# Patient Record
Sex: FEMALE | Race: BLACK OR AFRICAN AMERICAN | NOT HISPANIC OR LATINO | Employment: FULL TIME | URBAN - METROPOLITAN AREA
[De-identification: names, ages, dates, MRNs, and addresses within clinical notes are randomized per-mention and may not be internally consistent; named-entity substitution may affect disease eponyms.]

---

## 2019-01-14 ENCOUNTER — NON-PROVIDER VISIT (OUTPATIENT)
Dept: OBGYN | Facility: CLINIC | Age: 24
End: 2019-01-14

## 2019-01-14 DIAGNOSIS — Z32.00 ENCOUNTER FOR PREGNANCY TEST, RESULT UNKNOWN: ICD-10-CM

## 2019-01-14 LAB
INT CON NEG: NEGATIVE
INT CON POS: POSITIVE
POC URINE PREGNANCY TEST: POSITIVE

## 2019-01-14 PROCEDURE — 81025 URINE PREGNANCY TEST: CPT | Performed by: OBSTETRICS & GYNECOLOGY

## 2019-02-13 ENCOUNTER — INITIAL PRENATAL (OUTPATIENT)
Dept: OBGYN | Facility: CLINIC | Age: 24
End: 2019-02-13
Payer: MEDICAID

## 2019-02-13 VITALS
SYSTOLIC BLOOD PRESSURE: 112 MMHG | HEIGHT: 65 IN | BODY MASS INDEX: 20.99 KG/M2 | DIASTOLIC BLOOD PRESSURE: 58 MMHG | WEIGHT: 126 LBS

## 2019-02-13 DIAGNOSIS — N76.0 BACTERIAL VAGINOSIS: ICD-10-CM

## 2019-02-13 DIAGNOSIS — E10.9 TYPE 1 DIABETES MELLITUS WITHOUT COMPLICATION (HCC): ICD-10-CM

## 2019-02-13 DIAGNOSIS — O21.9 NAUSEA AND VOMITING DURING PREGNANCY: ICD-10-CM

## 2019-02-13 DIAGNOSIS — B96.89 BACTERIAL VAGINOSIS: ICD-10-CM

## 2019-02-13 DIAGNOSIS — O09.90 SUPERVISION OF HIGH RISK PREGNANCY, ANTEPARTUM: Primary | ICD-10-CM

## 2019-02-13 LAB
APPEARANCE UR: ABNORMAL
BILIRUB UR STRIP-MCNC: ABNORMAL MG/DL
COLOR UR AUTO: ABNORMAL
GLUCOSE UR STRIP.AUTO-MCNC: NEGATIVE MG/DL
KETONES UR STRIP.AUTO-MCNC: NEGATIVE MG/DL
LEUKOCYTE ESTERASE UR QL STRIP.AUTO: NEGATIVE
NITRITE UR QL STRIP.AUTO: NEGATIVE
PH UR STRIP.AUTO: 8.5 [PH] (ref 5–8)
PROT UR QL STRIP: ABNORMAL MG/DL
RBC UR QL AUTO: NEGATIVE
SP GR UR STRIP.AUTO: 1.02
UROBILINOGEN UR STRIP-MCNC: ABNORMAL MG/DL

## 2019-02-13 PROCEDURE — 81002 URINALYSIS NONAUTO W/O SCOPE: CPT | Performed by: NURSE PRACTITIONER

## 2019-02-13 PROCEDURE — 59401 PR NEW OB VISIT: CPT | Performed by: NURSE PRACTITIONER

## 2019-02-13 RX ORDER — PROMETHAZINE HYDROCHLORIDE 25 MG/1
25 TABLET ORAL EVERY 6 HOURS PRN
Qty: 30 TAB | Refills: 0 | Status: SHIPPED | OUTPATIENT
Start: 2019-02-13 | End: 2019-05-30

## 2019-02-13 RX ORDER — METRONIDAZOLE 500 MG/1
500 TABLET ORAL 2 TIMES DAILY
Qty: 14 TAB | Refills: 0 | Status: SHIPPED | OUTPATIENT
Start: 2019-02-13 | End: 2019-02-20

## 2019-02-13 ASSESSMENT — ENCOUNTER SYMPTOMS
EYES NEGATIVE: 1
GASTROINTESTINAL NEGATIVE: 1
PSYCHIATRIC NEGATIVE: 1
RESPIRATORY NEGATIVE: 1
NEUROLOGICAL NEGATIVE: 1
CARDIOVASCULAR NEGATIVE: 1
MUSCULOSKELETAL NEGATIVE: 1
CONSTITUTIONAL NEGATIVE: 1

## 2019-02-13 NOTE — PROGRESS NOTES
Pt here today for NOB visit  LMP: 11/28/2018  WT: 126 lb  BP: 112/58  Pt states having a lot of N&V. Pt also reports having vaginal itching with white vaginal discharge x 1 week. States no other concerns.    Pt states she has Type I diabetes Dx in 2005 on insulin taking Novolin 70/30  Desires AFP Screening  Pt states she received the influenza vaccine in 11/2018  Preferred pharmacy verified with patient   Chaperone offered, pt accepts  Good # [phone]

## 2019-02-13 NOTE — LETTER
Cystic Fibrosis Carrier Testing  Nicolasa Curtis    The following information is about a blood test that can be done to determine if you and/or your partner carry the gene for cystic fibrosis.    WHAT IS CYSTIC FIBROSIS?  · Cystic fibrosis (CF) is an inherited disease that affects more than 25,000 American children and young adults.  · Symptoms of CF vary but include lung congestion, pneumonia, diarrhea and poor growth.  Most people with CF have severe medical problems and some die at a young age.  Others have so few symptoms they are unaware they have CF.  · CF does not affect intelligence.  · Although there is no cure for CF at this time, scientists are making progress in improving treatment and in searching for a cure.  In the past many people with CF  at a very young age.  Today, many are living into their 20’s and 30’s.    IS THERE A CHANCE MY BABY COULD HAVE CYSTIC FIBROSIS?  · You can have a child with CF even if there is no history in your family (see chart below).  · CF testing can help determine if you are a carrier and at risk to have a child with CF.  Note: if both parents are carriers, there is a 1 in 4 (25%) chance with each pregnancy that they will have a child with CF.  · Carriers have one normal CF gene and one altered CF gene.  · People with CF have two altered CF genes.  · Most people have two normal copies of the CF gene.    Approximate risk that a couple with no family history of cystic fibrosis will have a child with cystic fibrosis:    Ethnic background / Risk     couple:  1 in 2,500   couple:  1 in 15,000            couple:  1 in 8,000     American couple:  1 in 32,000     WHAT TESTING IS AVAILABLE?  · There is a blood test that can be done to find out if you or your partner is a carrier.  · It is important to understand that CF carrier testing does not detect all CF carriers.  · If the test shows that you are both CF carriers, you unborn baby  can be tested to find out if the baby has CF.    HOW MUCH DOES IT COST TO HAVE CYSTIC FIBROSIS CARRIER TESTING?  · Cost and insurance coverage for CF carrier testing vary depending upon the laboratory used and your insurance policy.  · The average cost for CF carrier testing is $300 per person.  · Your genetic counselor can provide you with more information about cystic fibrosis carrier testing.    _____  Yes, I am interested in discussing carrier testing with a genetic counselor.    _____  No, I am not interested in CF carrier testing or in receiving more information about CF carrier testing.      Client signature: ________________________________________  2/13/2019

## 2019-02-13 NOTE — PROGRESS NOTES
"S:  Nicolasa Curtis is a 23 y.o.  who presents for her new OB exam.  She is 11w0d with and ELIZABETH of Estimated Date of Delivery: 19 based off of LMP . She has no complaints.  She is currently not working. Discussed heavy lifting and chemical exposure. No ER visits or previous care in this pregnancy.     Desires AFP.  Declines CF.  Denies VB, LOF, or cramping.  Denies dysuria, vaginal DC. Reports no fetal movement.     Pt is single and lives with boyfriend.  Pregnancy is unplanned but desired.    She is a type 1 diabetic. Hemoglobin A1C ordered, referral to endocrinology and perinatology placed.      Discussed diet and exercise during pregnancy. Encouraged good nutrition, and daily exercise including walking or swimming. Discussed expected weight gain during pregnancy. Discussed adequate hydration during pregnancy.    Past Medical History:   Diagnosis Date   • Diabetes (HCC)     Type I Dx in      History reviewed. No pertinent family history.  Social History     Social History   • Marital status: Single     Spouse name: N/A   • Number of children: N/A   • Years of education: N/A     Occupational History   • Not on file.     Social History Main Topics   • Smoking status: Never Smoker   • Smokeless tobacco: Never Used   • Alcohol use No   • Drug use: No   • Sexual activity: Yes     Partners: Male      Comment: Unplanned pregnancy. pt states was not on birth control     Other Topics Concern   • Not on file     Social History Narrative   • No narrative on file     OB History    Para Term  AB Living   1             SAB TAB Ectopic Molar Multiple Live Births                    # Outcome Date GA Lbr Chuy/2nd Weight Sex Delivery Anes PTL Lv   1 Current                   History of Varicella Virus: yes  History of HSV I or II in self or partner: no  History of Thyroid problems: no    O:  Blood pressure 112/58, height 1.651 m (5' 5\"), weight 57.2 kg (126 lb), last menstrual period 2018.   See " "Prenatal Physical.    Wet mount: yes, slide collected and read by me: positive whiff, neg yeast, many clue cells, neg trich. Will treat for BV  Chaperone offered: yes and present    A:   1.  IUP @ 11w0d per LMP        2.  S=D        3.  See problem list below        4.  Type 1 diabetic        5.  Bacterial vaginosis     Patient Active Problem List    Diagnosis Date Noted   • Type 1 diabetes mellitus without complication (HCC) 02/13/2019   • Supervision of high risk pregnancy, antepartum 02/13/2019         P:  1.  GC/CT & pap done        2.  Prenatal labs ordered - lab slip given        3.  Discussed PNV, diet, avoidances and adequate water intake        4.  NOB packet given        5.  Return to office in 1-2 wks        6.  Complete OB US 8-9 wks        7.  Diabetic education        8.  Hemoglobinopathy profile        9.  Endocrinology and perinatology referral    No orders of the defined types were placed in this encounter.      HPI    Review of Systems   Constitutional: Negative.    HENT: Negative.    Eyes: Negative.    Respiratory: Negative.    Cardiovascular: Negative.    Gastrointestinal: Negative.    Genitourinary: Negative.    Musculoskeletal: Negative.    Skin: Negative.    Neurological: Negative.    Endo/Heme/Allergies: Negative.    Psychiatric/Behavioral: Negative.    All other systems reviewed and are negative.         Objective:     /58   Ht 1.651 m (5' 5\")   Wt 57.2 kg (126 lb)   LMP 11/28/2018   BMI 20.97 kg/m²      Physical Exam   Constitutional: She is oriented to person, place, and time. She appears well-developed and well-nourished.   HENT:   Head: Normocephalic.   Nose: Nose normal.   Eyes: Conjunctivae are normal.   Neck: Normal range of motion. Neck supple.   Cardiovascular: Normal rate, regular rhythm, normal heart sounds and intact distal pulses.    Pulmonary/Chest: Effort normal and breath sounds normal.   Abdominal: Soft. Bowel sounds are normal.   Genitourinary: Uterus normal. " Vaginal discharge found.   Musculoskeletal: Normal range of motion.   Neurological: She is alert and oriented to person, place, and time.   Skin: Skin is warm and dry. Capillary refill takes less than 2 seconds.   Psychiatric: She has a normal mood and affect. Her behavior is normal. Judgment and thought content normal.   Nursing note and vitals reviewed.       Assessment/Plan:     1. Type 1 diabetes mellitus without complication (HCC)  - HEMOGLOBIN A1C; Future  - REFERRAL TO DIABETIC EDUCATION Diabetes Self Management Education / Training (DSME/T) and Medical Nutrition Therapy (MNT): Initial Group DSME/MNT as authorized by payor; DSME/T Content: Diabetes as disease process, Preconception / pregnancy manage...  - REFERRAL TO ENDOCRINOLOGY  - POCT Urinalysis    2. Supervision of high risk pregnancy, antepartum  - US-OB 2ND 3RD TRI COMPLETE; Future  - THINPREP RFLX HPV ASCUS W/CTNG; Future  - PREG CNTR PRENATAL PN; Future  - REFERRAL TO PERINATOLOGY  - POCT Urinalysis  - HEMOGLOBINOPATHY PROFILE; Future    3. Bacterial vaginosis  - metroNIDAZOLE (FLAGYL) 500 MG Tab; Take 1 Tab by mouth 2 Times a Day for 7 days.  Dispense: 14 Tab; Refill: 0    4. Nausea and vomiting during pregnancy  - promethazine (PHENERGAN) 25 MG Tab; Take 1 Tab by mouth every 6 hours as needed for Nausea/Vomiting.  Dispense: 30 Tab; Refill: 0

## 2019-02-20 LAB
ABO GROUP BLD: NORMAL
BLD GP AB SCN SERPL QL: NORMAL
HBA1C MFR BLD: 10.2 % (ref ?–5.8)
HCT VFR BLD AUTO: NORMAL %
HGB BLD-MCNC: NORMAL G/DL
HIV 1+2 AB+HIV1 P24 AG SERPL QL IA: NORMAL
RH BLD: NORMAL
RUBV IGG SERPL IA-ACNC: NORMAL
TREPONEMA PALLIDUM IGG+IGM AB [PRESENCE] IN SERUM OR PLASMA BY IMMUNOASSAY: NORMAL

## 2019-02-21 ENCOUNTER — NON-PROVIDER VISIT (OUTPATIENT)
Dept: OBGYN | Facility: CLINIC | Age: 24
End: 2019-02-21
Payer: MEDICAID

## 2019-02-21 ENCOUNTER — ROUTINE PRENATAL (OUTPATIENT)
Dept: OBGYN | Facility: CLINIC | Age: 24
End: 2019-02-21
Payer: MEDICAID

## 2019-02-21 VITALS — BODY MASS INDEX: 21.47 KG/M2 | SYSTOLIC BLOOD PRESSURE: 108 MMHG | DIASTOLIC BLOOD PRESSURE: 76 MMHG | WEIGHT: 129 LBS

## 2019-02-21 DIAGNOSIS — O24.011: ICD-10-CM

## 2019-02-21 DIAGNOSIS — E10.9 TYPE 1 DIABETES MELLITUS WITHOUT COMPLICATION (HCC): ICD-10-CM

## 2019-02-21 LAB — GLUCOSE BLD-MCNC: 374 MG/DL (ref 70–100)

## 2019-02-21 PROCEDURE — 97802 MEDICAL NUTRITION INDIV IN: CPT | Performed by: DIETITIAN, REGISTERED

## 2019-02-21 PROCEDURE — 82962 GLUCOSE BLOOD TEST: CPT | Performed by: OBSTETRICS & GYNECOLOGY

## 2019-02-21 PROCEDURE — 90040 PR PRENATAL FOLLOW UP: CPT | Performed by: OBSTETRICS & GYNECOLOGY

## 2019-02-21 RX ORDER — LANCETS
1 EACH MISCELLANEOUS 4 TIMES DAILY
Qty: 120 EACH | Refills: 2 | Status: SHIPPED | OUTPATIENT
Start: 2019-02-21 | End: 2019-05-30 | Stop reason: CLARIF

## 2019-02-21 NOTE — PROGRESS NOTES
Diabetic prenatal visit;    12w1d  Patient Active Problem List    Diagnosis Date Noted   • Type 1 diabetes mellitus without complication (HCC) 02/13/2019   • Supervision of high risk pregnancy, antepartum 02/13/2019       The patient presents for prenatal care. She is doing well. Denies contractions leakage of fluid or vaginal bleeding. Having good fetal movement    Vitals:    02/21/19 1401   BP: 108/76   Weight: 58.5 kg (129 lb)       Hemoglobin A1C; performed but results not available yet    Fasting sugars; not testing yet  One hour Postprandial sugars; not testing yet      Size equals dates, normal fetal heart rate      Continue ADA diet      New insulin regimen;   Morning; 50 units Novolin  Dinnertime; 0  Bedtime; 50 units Novolin      Patient pre-existing type I diabetic since age 7 recently moved from [place].  The patient has very poorly treated-probably untreated type 1 diabetes.  She states in the past she can run anywhere from 500-700 and has been admitted multiple times in the last year for control of blood sugars in [place].  Referral for consultation with endocrinology and maternal-fetal medicine have been placed and we are waiting for appointments to be made.    The patient has her diabetic teaching class for fingerstick testing tomorrow.  She will she will follow up with us next week.  Hopefully at this time will have the results of her prenatal labs and hemoglobin A1c.    In addition, she will need ophthalmology examination for retinal evaluation as well as EKG.    We discussed the risks of teratogenic  effects of elevated blood sugar on fetus.    NSTs twice weekly starting at 32 weeks if class AII GDM or worse  Increase fluid hydration to 2 L per day  Discussed proper shoes to be worn her pregnancy  Increase exercise daily walking 30 minutes per day  Delivery by 39 weeks if class AII GDM or worse  Discussed support hose use during pregnancy    Followup; 1 weeks

## 2019-02-21 NOTE — PROGRESS NOTES
OB f/u GDM. Good #[phone]   -FM  Pt. Denies VB, LOF, or UC's.   Pharmacy verified.  Diabetic supplies   BS in clinic : 374 Pt states last ate at 1400 pm  Chaperone offered and Provided  Pt states AM:50 novolin  QHS:50 novolin

## 2019-02-21 NOTE — PROGRESS NOTES
Subjective:  -Pt states she taking NPH 70/30 50 units in AM and 50 units in PM   -Has been feeling nauseous and eats only 2 times per day   -Had chicken, mashed potatoes and fruit with a lot of juice (POC was 374)   -Pt states she has a meter at home but doesn't know the name and states it is old   -Pt recently moved her from [place]   -Does not yet have an Endocrinologist but referral is pending     Objective:   Anthropometrics:  Today's weight: 129lb  Pre-pregnancy weight: 115lb  Total weight gain: +14lb  Weeks gestation: 12w1d    Biochemical data, medical test and procedures:  No results found for: HBA1C@  Lab Results   Component Value Date/Time    POCGLUCOSE 374 (A) 02/21/2019 02:11 PM       Glucose Log Book (most recent):   NA. Pt is not checking or recording FSBS     Assessment:   Nutrition Diagnosis (PES Statement):  · Altered nutrition related lab values related to endocrine dysfunction as evidenced by OGTT    Recommended Diet:  Pt will be given her meal plan tomorrow     Assessment Notes:   Briefly introduced pt to diabetes in pregnancy meal plan. Explained the importance of eating a carb controlled diet, small frequent meals and to avoid all simple sugars. She agrees to stop drinking juice.   Gave pt the Accucheck glucometer today and a Rx was sent to her pharmacy to  the supplies.         Follow up tomorrow for GDM/Diabetes Class   Miranda Zambrano, RD, LD, CDE  [phone]

## 2019-02-22 ENCOUNTER — NON-PROVIDER VISIT (OUTPATIENT)
Dept: HEALTH INFORMATION MANAGEMENT | Facility: MEDICAL CENTER | Age: 24
End: 2019-02-22
Payer: MEDICAID

## 2019-02-22 VITALS — BODY MASS INDEX: 21.26 KG/M2 | HEIGHT: 65 IN | WEIGHT: 127.6 LBS

## 2019-02-22 DIAGNOSIS — O24.019 TYPE 1 DIABETES MELLITUS DURING PREGNANCY, ANTEPARTUM: ICD-10-CM

## 2019-02-22 PROCEDURE — G0109 DIAB MANAGE TRN IND/GROUP: HCPCS | Performed by: INTERNAL MEDICINE

## 2019-02-22 NOTE — PROGRESS NOTES
Nicolasa came to the Gestational Diabetes program.  She has had Type 1 Diabetes since age 7.  She is currently taking 70/30 insulin 50 units in the morning and at bedtime.  She recently moved here and has not had a doctor managing her diabetes.  She states her father has Type 2 Diabetes.  She denies any problems with this pregnancy.  She was supplied an One Touch Verio Flex meter.  She was able to do a return demonstration without difficulty.   She will keep walking and stay well hydrated with water. Her meal plan is scanned into Media and her Doctor Letters with what was taught can be found under the Letters tab.

## 2019-02-22 NOTE — PROGRESS NOTES
Nicolasa received a 1800 calorie meal plan (based on 30 kcal/kg of current weight) consisting of 3 meals and 3 snacks (see media for copy of meal plan).   Pt's prepregnancy weight was: 115lb. She has gained +12.6lb so far in this pregnancy.     Recommended meal times:   B: 8am   S: 10am   L:12pm   S: 3pm   D: 6-6:30pm   S: 10pm     Nicolasa was given an Accucheck meter at the pregnancy center yesterday, however, the One Touch Verio Flex will be a more appropriate choice based on her insurance coverage. She agrees to call [facility] today to get them to order her supplies for this meter.    Pt was educated on carbohydrate containing foods vs non carbohydrate containing foods, the importance of small frequent meals, limiting carbohydrate to 1 serving in the morning, no fruit before noon/12pm, and avoiding all concentrated sweets for the remainder of her pregnancy. Explained the importance of not going >4hrs btwn eating during the day and no longer than 10hours overnight. Patient agrees to follow the meal plan and guidelines provided.

## 2019-02-22 NOTE — LETTER
February 22, 2019                   Re: Nicolasa Curtis     1995         [id number]       Mariama Rocha C.N.M.  [address] [place] [address]  [place] [address]      Dear :Mariama Rocha C.N.M.    On 2/22/2019, your patient Nicolasa Curtis, received 1 hour of nurse training from the Diabetes Center at [facility] for management of her gestational diabetes.  Her Estimated Date of Delivery: 9/4/19.  We taught the following subjects:    Introduction to gestational diabetes, benefits and responsibilities of patient, physiology of diabetes and the diease process, benefits of blood glucose monitoring and record keeping, medication action and possible side effects, hypoglycemia, sick day management, exercise, stress reduction and travel with diabetes.       Nurse assessment / Education:    Comments:    Edema:no      Weight:Weight: 57.9 kg (127 lb 9.6 oz)         Complaints:yes - Type 1 Diabetes uncontrolled.      Pathophysiology of diabetes in pregnancy    Discuss  potential maternal and fetal complications in pregnancy with diabetes.     Importance of blood glucose monitoring   Proper testing technique using a One Touch Verio Flex meter.    At 12 pm, the meter read 176, which was 2.5 hours after eating.  Testing: fasting and one hour after meals,  expected ranges and rationale for strict control.     Ketone test today:no       Recognition and treatment of hypoglycemia.     Insulin taught: No, currently taking 70/30 50 units in the am and at bedtime.  Insulin briefly dicussed at this time.    Should patient require insulin later in pregnancy, she would need further education.   IReviewed fetal kick counts and other tests to determine fetal well-being  Discuss benefits and risks of exercise in pregnancy  Discuss when to call Doctor  Discuss sick day care  Importance of wearing diabetes identification      Patient/caregiver appeared to understand the content as demonstrated by appropriate questions.     Nicolasa  Fariba Curtis was encouraged to discuss this further with you.    Hopefully this will help in your management of her care.  If we can be of further assistance, please feel free to call.    Thank you for the referral.    Sincerely,    Radha Ugalde RN CDE  GDM CLASS  Certified Diabetes Educator

## 2019-02-22 NOTE — LETTER
February 22, 2019                   Re: Nicolasa Curtis     1995             Mariama Rocha C.N.M.  [address] [place] [address]      Dear :Mariama Rocha C.N.M.    On 2/22/2019, your patient Nicolasa Curtis, received 1 hours of nutrition training from the Diabetes Center at [facility] for management of her gestational diabetes.  Her EDC is Estimated Date of Delivery: 9/4/19.  We taught the following subjects:    Patient was provided with a 1800 calorie meal plan with 3 meals and 3 snacks.  Meal plan contains 180 grams of carbohydrates per day.   Importance of meal planning in diabetes management during pregnancy  Importance of consistent timing of meals and snacks and agreed upon times  Avoidance of simple carbohydrates  Metabolism of food components relating to pregnancy  Identification of foods in food groups  Patient demonstrates adequate ability to utilize meal planning manual for reference  Plan 3 meals and 3 snacks with 90% accuracy  Review basic principles of eating out  Reviewed precautions with artificial sweeteners    Comments:  Nicolasa agreed to follow the meal plan and to eat at the times agreed upon.  She will check blood glucose 4 times a day and record the values in her log book.       Hopefully this will help in your management of her care.  If we can be of further assistance, please feel free to call.    Thank you for the referral.    Sincerely,  Miranda Zambrano, GRABIEL, LD, CDE  [phone]

## 2019-02-26 ENCOUNTER — TELEPHONE (OUTPATIENT)
Dept: OBGYN | Facility: CLINIC | Age: 24
End: 2019-02-26

## 2019-02-26 DIAGNOSIS — O24.011 TYPE 1 DIABETES MELLITUS COMPLICATING PREGNANCY IN FIRST TRIMESTER, ANTEPARTUM: ICD-10-CM

## 2019-02-26 NOTE — TELEPHONE ENCOUNTER
Pt called requesting an Rx for Novolong 70/30 insulin, states she was told that she may have to switch to a different insulin. Rx sent to pharmacy on file per Dr. Navarro. MD to continue in current insulin and checking BS, bring log next appt.   Also test strips Rx sent.  Verbalized understanding.

## 2019-02-28 ENCOUNTER — ROUTINE PRENATAL (OUTPATIENT)
Dept: OBGYN | Facility: CLINIC | Age: 24
End: 2019-02-28
Payer: MEDICAID

## 2019-02-28 ENCOUNTER — TELEPHONE (OUTPATIENT)
Dept: OBGYN | Facility: CLINIC | Age: 24
End: 2019-02-28

## 2019-02-28 ENCOUNTER — NON-PROVIDER VISIT (OUTPATIENT)
Dept: OBGYN | Facility: CLINIC | Age: 24
End: 2019-02-28
Payer: MEDICAID

## 2019-02-28 VITALS — SYSTOLIC BLOOD PRESSURE: 94 MMHG | BODY MASS INDEX: 21.3 KG/M2 | WEIGHT: 128 LBS | DIASTOLIC BLOOD PRESSURE: 62 MMHG

## 2019-02-28 DIAGNOSIS — O24.011 TYPE 1 DIABETES MELLITUS AFFECTING PREGNANCY IN FIRST TRIMESTER, ANTEPARTUM: ICD-10-CM

## 2019-02-28 DIAGNOSIS — O24.019 TYPE 1 DIABETES MELLITUS DURING PREGNANCY, ANTEPARTUM: ICD-10-CM

## 2019-02-28 DIAGNOSIS — O09.90 SUPERVISION OF HIGH RISK PREGNANCY, ANTEPARTUM: ICD-10-CM

## 2019-02-28 PROCEDURE — 97803 MED NUTRITION INDIV SUBSEQ: CPT | Performed by: DIETITIAN, REGISTERED

## 2019-02-28 PROCEDURE — 90040 PR PRENATAL FOLLOW UP: CPT | Performed by: OBSTETRICS & GYNECOLOGY

## 2019-02-28 RX ORDER — ONDANSETRON 4 MG/1
4 TABLET, FILM COATED ORAL EVERY 4 HOURS PRN
Qty: 20 TAB | Refills: 2 | Status: SHIPPED | OUTPATIENT
Start: 2019-02-28 | End: 2019-05-30

## 2019-02-28 NOTE — PROGRESS NOTES
Follow Up Nutrition Assessment:   Time: 2:45-3:20pm     Subjective:  -Pt c/o ongoing nausea, worse in the morning, but some days all day long   -Doesn't usually eat until lunch time   -Had low blood sugar this morning of 41 which she corrected with juice and now despite not eating anything besides peanuts her blood sugars are in high 200s    -Ate pizza 2 slices for dinner last night   -No bedtime snack . Snacks that she likes include yogurt, peanuts/nuts, and cheese.   -Not checking FSBS 4 times a day   -Is taking NPH 70/30 50 units BID (AM and before Dinner)    Objective:   Anthropometrics:   Today's Weight: 128lb    Pre-pregnancy weight: 115lb  Total weight gain: +13  Weeks gestation: 13w1d    Biochemical data, medical test and procedures:  Lab Results   Component Value Date/Time    POCGLUCOSE 374 (A) 02/21/2019 02:11 PM       Glucose Log Book (most recent):    Fasting glucose range: 41-77   1 hour post prandial glucose range:     Assessment:   Recommended Diet:  1800 calorie meal plan (see media for detailed meal plan)    Assessment Notes:   Pt is not checking FSBS as recommended to, and is not following the meal plan. She states it is too hard to eat when she feels nauseous.   Dr. Corley would like pt to start short acting insulin based on carb counting using a 1:10 insulin to carb ratio, in addition to NPH BID.   Today we reviewed how to carb count. Reviewed label reading, calorieking apps, and estimation method. Pt's significant other was present for the education and was involved in learning.     Pt was given the following insulin regimen to start with:   NPH 50 units q AM   NPH 35 units q HS   Lispro 1:10g carbs, at each meal   This will very likely need to be adjusted by OB or Endo next week.   Pt was informed to contact the office/MD (or proceed to ER) if experiencing any extreme high or low blood sugars on this new insulin regimen       Plan: Monitoring & Evaluation  Goals:  1. Follow meal plan as  outlined above; 3 meals and 3 snacks daily.   2. Check FSBS 4 times a day  (fasting, and 1 hour after each meal)  3. FSBS Goals= Fasting <90; 1 hour PP <130   4. Bring blood sugar log book to each appointment   5. Appropriate weight gain during pregnancy  6. Use milk 8oz to correct for hypoglycemia <60   7. Call endocrinology to set up that appt ASAP  8. Begin carb counting using 1:10g insulin to carb ratio   9. Avoid fruit in the morning   10. Do not skip breakfast; at least have a small snack       Follow up 1 week   Miranda Zambrano, RD, LD, CDE  [phone]

## 2019-02-28 NOTE — TELEPHONE ENCOUNTER
Pt stated she called Endocrinology and was told they would call her within 24 hrs to schedule appt and still has not heard back from them.   Called [facility] endocrinology, spoke with Mo, she stated they called pt on 2/25/19 and left her a VM but patient has not called them back to schedule. Pt notified

## 2019-02-28 NOTE — PROGRESS NOTES
Pt. Here for OB/FU New GDM.   U/S on 5/1/19  Good # [phone]  Pt states having some nausea.   Pharmacy verified.   BS in clinic : 197 Pt states last ate at 11:30 am pt states had peanuts.   Pt states has appt with Dr. Alvarado 3/5/19   Pt given AFP lab slip today along with instructions.   Pt states already received flu vaccine.   Pt given phone # to call [facility] to make appt.

## 2019-03-01 NOTE — PROGRESS NOTES
Chief complaint: Routine OB visit    S: Pt here for OB follow up. Doing well.   Patient has a long-standing history of type 1 diabetes since age 7.  Per her report, had greater than 10 hospitalizations last year due to DKA.    Last eye exam .    Currently on 50 units of NPH 70/30 in the morning and 50 units NPH 70/30 in the evening.    Review of sugar logs show highly elevated level some in the 300s.  Most fasting levels greater than 100.    negative fetal movement.    negative vaginal bleeding.    negative leakage of fluid.    negative contractions.    Reviewed blood sugar log with patient.  Reviewed diet.  Questions answered.    O: VSS Afeb      See prenatal vitals, chart for today's exam      Insulin regimen  As above      A/P:  23 y.o.  13w1d with insulin-dependent diabetes who presents for obstetric follow-up.    1.  Labor precautions and fetal kick counts educated  2.  Change insulin dosage as follows:  -Stop NPH 70/30.  -Start NPH 50 units in a.m., 35 units in p.m.  -Start lispro 1-10 carb ratio during meals  Urgent referral to endocrinology placed.  Patient would likely benefit from insulin pump  3.  NSTs: 2x/week to begin at 32 weeks.  4.  Continue prenatal vitamins.  5.  Watch weight gain.  Exercise at least 30 minutes daily  6.  Drink at least 2 liters of water daily.  7.  Encouraged prenatal classes.  8.  Follow-up in 2 weeks for obstetric follow-up.    Last A1c 10.2.  Discussed with the patient, complications associated with uncontrolled diabetes during pregnancy.  Mainly risk of: Preeclampsia,  premature rupture membranes,  labor, intrauterine fetal demise, cardiac abnormalities, anencephaly, abnormalities during labor, increased risk of ,  hypoglycemia.  Tight sugar control recommended to patient    We will also obtain metabolic panel, baseline 24-hour urine protein, TSH, free T4    Discussed with the patient quad screen as well as CF and hemoglobin  electrophoresis screening.  Patient opted for quad screen but declined cystic fibrosis and hemoglobin electrophoresis screening.    Referral to ophthalmology placed    Patient scheduled to see perinatology next week.  Discussed with the patient that her hemoglobin A1c puts her at high risk for cardiac malformations and anencephaly.    Discussed with the patient use of ASA 81 to decrease risk of preeclampsia.  Risks, mainly early bleeding (but not fetal loss) and gastroschisis discussed with the patient.  Patient will start immediately.    Patient is been having issues with nausea vomiting.  As she is past her first trimester and continues to have significant nausea vomiting will start Zofran.  Minimal relief with Phenergan in the past

## 2019-03-07 ENCOUNTER — NON-PROVIDER VISIT (OUTPATIENT)
Dept: OBGYN | Facility: CLINIC | Age: 24
End: 2019-03-07
Payer: MEDICAID

## 2019-03-07 ENCOUNTER — ROUTINE PRENATAL (OUTPATIENT)
Dept: OBGYN | Facility: CLINIC | Age: 24
End: 2019-03-07
Payer: MEDICAID

## 2019-03-07 VITALS — BODY MASS INDEX: 20.8 KG/M2 | SYSTOLIC BLOOD PRESSURE: 102 MMHG | DIASTOLIC BLOOD PRESSURE: 68 MMHG | WEIGHT: 125 LBS

## 2019-03-07 DIAGNOSIS — E10.9 TYPE 1 DIABETES MELLITUS WITHOUT COMPLICATION (HCC): ICD-10-CM

## 2019-03-07 DIAGNOSIS — O24.019 TYPE 1 DIABETES MELLITUS DURING PREGNANCY, ANTEPARTUM: ICD-10-CM

## 2019-03-07 DIAGNOSIS — O09.92 HIGH-RISK PREGNANCY IN SECOND TRIMESTER: ICD-10-CM

## 2019-03-07 PROCEDURE — 90040 PR PRENATAL FOLLOW UP: CPT | Performed by: OBSTETRICS & GYNECOLOGY

## 2019-03-07 PROCEDURE — 97803 MED NUTRITION INDIV SUBSEQ: CPT | Performed by: DIETITIAN, REGISTERED

## 2019-03-07 NOTE — PROGRESS NOTES
CHOLO:  14w1d    Pt reports doing well.  Denies vaginal bleeding, contractions, LOF.    Changed last week to   NPH 50/35  Lispro carb ratio 1:10     Previously on 70/30    Significant N/V, ate some crackers last night but can't hold down anything today. Has lost approx 2kg in 1 wk. Taking phenergan which helps some, reports it makes her chest feel heavy when she takes it.  Is not taking Zofran as it did not help previously.    BG lo-382, no patterns visible, primarily elevated    /68   Wt 56.7 kg (125 lb)   LMP 2018   BMI 20.80 kg/m²   gen: AAO, NAD  FHTs: 150s    A/P: 23 y.o.  @ 14w1d by lmp alone w/ T1DM  - FOB expressed frustration that pt has seen different MD every week.  Discussed group practice, encouraged if uncomfortable w/ care here to seek care elsewhere.  Discussed also that unfortunately her diabetes prior to and during this early pregnancy was very poorly controlled which complicates things, and that typically type 1 diabetes with glucose similar to hers really does need endocrine care.  Patient reports she was admitted within the last few years prior to moving to [place] with DKA.  Patient reports she had an endocrine provider prior to moving to [place] in September, but has not establish care with endocrine here.  - to ED given significant weight loss, hyperglycemia/N/V for electrolytes, anticipate may need admission for hyperemesis and blood glucose control  -Attempting to get patient in with endocrine as soon as possible.  Offered appointment today at [facility] however patient reports she cannot make it.  We will continue to try and get her in depending on her admission, may be able to help facilitate this from an inpatient standpoint as well.  - A1c 10.2, has appointment with [facility], will need fetal echo and complete anatomy ultrasound given elevated A1c early pregnancy.      F/u pending d/c from hospital in 1 week    Eulalia Lainez MD  [facility], Women's  Health

## 2019-03-07 NOTE — PROGRESS NOTES
Follow Up Nutrition Assessment:   Time: 2:30-2:47pm     Subjective:  -Pt states she is not able to keep food down   -Has tried zofran recently and couldn't keep that down either   -Is eating maybe 1 or 2 times a day   -Has been taking NPH 50 units q AM, and NPH 35units q HS with Lispro 1:10g carb if/when she eats a meal     Objective:   Anthropometrics:   Today's Weight: 125lb    Pre-pregnancy weight: 115lb  ? Accuracy of this this reported wt   Total weight gain: -3lb in 1 week;  +10lb net gain   Weeks gestation: 14w1d    Biochemical data, medical test and procedures:  Lab Results   Component Value Date/Time    POCGLUCOSE 374 (A) 02/21/2019 02:11 PM       Glucose Log Book (most recent):    Fasting glucose range: 217-341    1 hour post prandial glucose range:     Assessment:   Recommended Diet:  1800 calorie meal plan (see media for detailed meal plan)    Assessment Notes:   Pt is very poorly controlled with diet and insulin and needs to see an Endocrinologist ASAP. Called [facility] and spoke to Anni who stated pt could be seen today and that there is nothing else available until August. Pt stated she couldn't go today.   Pt was sent to ER today to assess labs for electrolyte imbalance and hyperemesis. She has lost 3lb in 1 week; likely will need fluids. They may also need to admit to [facility] her for glycemic control.      Plan: Monitoring & Evaluation  Goals:  1. Follow meal plan as outlined above; 3 meals and 3 snacks daily.   2. Check FSBS 4 times a day  (fasting, and 1 hour after each meal)  3. FSBS Goals= Fasting <90; 1 hour PP <130   4. Bring blood sugar log book to each appointment   5. Appropriate weight gain during pregnancy  6. Proceed to ER for further medical assessment   7. Schedule appt with Endocrinologist ASAP ( I have placed a call to both [facility] and [facility] to see when she can be seen and to help expedite this process)       Follow up 1 week   Miranda Zambrano RD, LD, CDE  [phone]

## 2019-03-07 NOTE — PROGRESS NOTES
Pt here today for OB follow up / Diabetic  Denies FM  WT: 125 lb  BP: 102/68  Pt states having a lot of N&V. States    Pt states has appt with Dr. Tafoya on 05/20/2019  Random glucose check: 197 (4.5 hours post snack, stated had a toast and a cup of milk but unable to keep food down due to N&V)  Good # [phone]

## 2019-03-12 ENCOUNTER — DOCUMENTATION (OUTPATIENT)
Dept: OBGYN | Facility: MEDICAL CENTER | Age: 24
End: 2019-03-12

## 2019-03-12 ENCOUNTER — TELEPHONE (OUTPATIENT)
Dept: OBGYN | Facility: CLINIC | Age: 24
End: 2019-03-12

## 2019-03-12 NOTE — TELEPHONE ENCOUNTER
Per MD, tried to contact patient. Number on chart went to message stating it's not accepting incoming calls at the time.     MD notified. Patient is scheduled to come in on 3/14/2019.

## 2019-03-12 NOTE — Clinical Note
Hopefully pt will come on Thursday.  See note, [facility] has been trying to get hold of her to bring her in, if she comes we can call back line during her visit to help her schedule.

## 2019-03-12 NOTE — PROGRESS NOTES
Pt did not go to ED as instructed at last visit.    I spoke with MA at [facility] endocrine clinic for Dr. Ortiz.  She reports that they have been trying to get in touch with this patient to schedule an appointment, however patient does not have a working phone and they have been unable to reach her.  MA instructed me, the next time the patient is in our office, to call the back line at the endocrine clinic while the patient is present and we should be able to schedule her for an urgent appointment with endocrine.   Back-line to [facility] endocrine: [phone]  Today, I tried to call pt on mobile # listed in chart and also without success - goes immediately to message saying not accepting calls at this time.       Will pass along to [facility]PC MD seeing pt this week and dietician.        Eulalia Lainez MD  [facility], Women's Health

## 2019-03-14 ENCOUNTER — ROUTINE PRENATAL (OUTPATIENT)
Dept: OBGYN | Facility: CLINIC | Age: 24
End: 2019-03-14
Payer: MEDICAID

## 2019-03-14 ENCOUNTER — NON-PROVIDER VISIT (OUTPATIENT)
Dept: OBGYN | Facility: CLINIC | Age: 24
End: 2019-03-14
Payer: MEDICAID

## 2019-03-14 VITALS — WEIGHT: 123 LBS | BODY MASS INDEX: 20.47 KG/M2 | DIASTOLIC BLOOD PRESSURE: 60 MMHG | SYSTOLIC BLOOD PRESSURE: 98 MMHG

## 2019-03-14 DIAGNOSIS — O21.9 NAUSEA AND VOMITING DURING PREGNANCY PRIOR TO 22 WEEKS GESTATION: ICD-10-CM

## 2019-03-14 DIAGNOSIS — O24.019 TYPE 1 DIABETES MELLITUS DURING PREGNANCY, ANTEPARTUM: ICD-10-CM

## 2019-03-14 DIAGNOSIS — O09.90 SUPERVISION OF HIGH RISK PREGNANCY, ANTEPARTUM: ICD-10-CM

## 2019-03-14 DIAGNOSIS — E10.9 TYPE 1 DIABETES MELLITUS WITHOUT COMPLICATION (HCC): ICD-10-CM

## 2019-03-14 PROCEDURE — 97803 MED NUTRITION INDIV SUBSEQ: CPT | Performed by: DIETITIAN, REGISTERED

## 2019-03-14 PROCEDURE — 90040 PR PRENATAL FOLLOW UP: CPT | Performed by: OBSTETRICS & GYNECOLOGY

## 2019-03-14 NOTE — PROGRESS NOTES
GDM Class B . OB F/U.  + fetal movement  Denies any VB, LO or UC's  Phone # [phone]  Pharmacy confirmed  Diabetic supplies: Patient needs refill for NPH insulin, patient will call pharmacy to obtain insulin. Chart reviewed and patient should still have refills    Last seen endo early November of 2018.  Patient is scheduled to see Dr Alvarado two weeks from now, she doesn't recall the exact date.   Per patient she didn't go to the ER as instructed as she didn't feel the need she needed to go.   Patient has been scheudled for Endo at the [facility] on 3/19/2019 patient is aware to check in at 11:30am.   Patient left before I could provide her with the [facility] paperwork, this was mailed out to her to her address on her chart.

## 2019-03-14 NOTE — PROGRESS NOTES
S: Pt here for OB follow up. Doing well.   Reports some fetal movements.    negative vaginal bleeding.    negative leakage of fluid.    negative contractions.    Patient states her nausea and vomiting has improved-patient states she takes Zofran and she gets rare episodes of nausea and very rare episodes of emesis.  She states her diet is improved and she feels well.  We are unable to contact patient due to no-functional telephone number.  Please schedule her appointment with endocrinology today for next week at [facility].  Patient states she forgot her appointment with Dr. Alvarado and she made a follow-up appointment.  We discussed need for fetal cardiac echo also with Dr. Alvarado's office around 24 weeks gestation.  Patient's only concern today is when she will be able to find out the sex of the baby.  Reviewed blood sugar log with patient.  Reviewed diet.  Questions answered.    O: VSS Afeb      See prenatal vitals, chart for today's exam    FBS range:   1hr post prandial range:    Insulin regimen:  NPH 50 am/35 pm  Lispro carb ratio 1:10      A/P:  23 y.o.  15w1d with Type 1/ Class C DM who presents for obstetric follow-up.  Patient has type 1 diabetes that is not controlled mostly due to noncompliance with follow-up with endocrinology management.  Patient has appointment with endocrinology  Uncontrolled diabetes-maternal fetal risks reviewed.  Patient will follow-up with perinatology for fetal ultrasound along with fetal echocardiogram  Nausea and vomiting has improved/controlled with Zofran    1.   Continue Zofran for nausea and vomiting.  Compliance with diabetic diet discussed  2.  Continue current insulin regimen-insulin will be managed by endocrinology from next week  3.  NSTs: 2x/week to begin at 32 weeks.  4.  Continue prenatal vitamins.  5.  Watch weight gain.  Exercise at least 30 minutes daily  6.  Drink at least 2 liters of water daily.  7.  Encouraged prenatal classes.  8.  Pregnancy precautions  and plan of care reviewed  9.  Patient to follow-up in 1 week for reassessment

## 2019-03-14 NOTE — PROGRESS NOTES
Follow Up Nutrition Assessment:   Time: 2:05-2:16pm     Subjective:  -Pt states she has been better able to tolerate food and keep it down.   -Eats small portions, snack sized 3-4 times a day.   -FSBS POC today was 71; Pt reports eating cereal for breakfast, and then cheese and crackers for lunch today. She reports carbs were 25g and so she took 2 units of insulin with lunch.   -Gave pt saltene crackers but pt left before checking 15mins later   -Ate chicken with veggies and potato last night and took 2 units of Lispro with 1 hour PP of 104.   -Check FSBS 4+ times a day. Fasting and ~1 hour post prandial. Also checks sometimes before eating.       Objective:   Anthropometrics:   Today's Weight: 123.3lb    Pre-pregnancy weight: 115lb  Total weight gain: +8.3  ; (-5lb in 2 weeks)   Weeks gestation: 15w1d    Biochemical data, medical test and procedures:  Lab Results   Component Value Date/Time    POCGLUCOSE 374 (A) 02/21/2019 02:11 PM       Glucose Log Book (most recent):    Fasting glucose range:    1 hour post prandial glucose range:     Assessment:   Recommended Diet:  1800 calorie meal plan (see media for detailed meal plan)    Assessment Notes:   Nicolasa has not yet made Endo appt. MA called [facility] today and pt is scheduled for an appt next Tuesday at 11:30am. Pt was notified of this. Pt left before being able to give her the new patient paperwork that was faxed to us to give to her.   Blood sugars remained uncontrolled however have improved since last week.   Encouraged pt to eat every 3 hours, small frequent meals and snacks to help with nausea and glycemic control. Briefly reviewed insulin to carb ratio. Emphasized importance of going to the Endocrinologist appt next Tuesday and to bring blood sugar log to that appt.      Plan: Monitoring & Evaluation  Goals:  1. Follow meal plan as outlined above; 3 meals and 3 snacks daily.   2. Check FSBS 4 times a day  (fasting, and 1 hour after each meal)  3.  FSBS Goals= Fasting <90; 1 hour PP <130   4. Bring blood sugar log book to each appointment   5. Appropriate weight gain during pregnancy  6. Endo appt next Tuesday  3/19 at 11:30am   7. Eat small frequent meals and snacks, even if small   8. Continue NPH BID and Lispro 1:10 until able to be assessed and adjusted by Endocrinologist next week.       Follow up 1 week   Miranda Zambrano RD, LD, CDE  [phone]

## 2019-03-21 ENCOUNTER — ROUTINE PRENATAL (OUTPATIENT)
Dept: OBGYN | Facility: CLINIC | Age: 24
End: 2019-03-21
Payer: MEDICAID

## 2019-03-21 VITALS — DIASTOLIC BLOOD PRESSURE: 64 MMHG | WEIGHT: 125 LBS | BODY MASS INDEX: 20.8 KG/M2 | SYSTOLIC BLOOD PRESSURE: 110 MMHG

## 2019-03-21 DIAGNOSIS — E10.9 TYPE 1 DIABETES MELLITUS WITHOUT COMPLICATION (HCC): ICD-10-CM

## 2019-03-21 DIAGNOSIS — O21.9 NAUSEA AND VOMITING DURING PREGNANCY PRIOR TO 22 WEEKS GESTATION: ICD-10-CM

## 2019-03-21 DIAGNOSIS — O09.90 SUPERVISION OF HIGH RISK PREGNANCY, ANTEPARTUM: ICD-10-CM

## 2019-03-21 DIAGNOSIS — O24.319 MODIFIED WHITE CLASS C PREGESTATIONAL DIABETES MELLITUS: ICD-10-CM

## 2019-03-21 LAB — GLUCOSE BLD-MCNC: 166 MG/DL (ref 70–100)

## 2019-03-21 PROCEDURE — 90040 PR PRENATAL FOLLOW UP: CPT | Performed by: OBSTETRICS & GYNECOLOGY

## 2019-03-21 PROCEDURE — 82962 GLUCOSE BLOOD TEST: CPT | Performed by: OBSTETRICS & GYNECOLOGY

## 2019-03-21 NOTE — PROGRESS NOTES
Pt here today for OB follow up / Diabetic  Reports light flutters  WT: 125 lb  BP: 110/64  Pt states still having nausea and occasional vomiting.     Sees Endocrinologist Sharri Ortiz at [facility] saw her on 03/19/19  has follow up 04/02/2019  Good # [phone]

## 2019-03-21 NOTE — PROGRESS NOTES
S: Pt here for OB follow up. Doing well.   Reports no fetal movements. + flutter.    negative vaginal bleeding.    negative leakage of fluid.    negative contractions.    Nausea and vomiting is controlled with Zofran.  Patient states she is able to keep down most of her food and fluids.  She has some rare nausea and one episode of emesis in the past week.  Patient states she saw her endocrinologist and her insulin dose is unchanged-patient states she will follow up on  for reevaluation by endocrinologist.  Patient states she has evaluation scheduled with perinatologist next Wednesday.  Patient states she forgot her blood sugar log today.  Reviewed diet.  Questions answered.    O: VSS Afeb      See prenatal vitals, chart for today's exam    FBS range: Patient forgot her glucose log  1hr post prandial range:   Fingerstick today was 166 1 hour postprandial.  Patient reports that her fasting fingerstick this morning was 85, after breakfast was 125 and after lunch was 170      Hemoglobin A1c was 10.2 on     Insulin regimen  NPH a.m. 50, regular a.m. 0  Regular with dinner 0, NPH at bedtime 36  Lispro carb ratio 1:10    A/P:  23 y.o.  16w1d with Type 1 DM who presents for obstetric follow-up.  Patient has no establish care with endocrinologist who will be managing her blood glucose during this pregnancy-patient has follow-up in  with endocrinologist.  Her current insulin dosage was unchanged.  Patient has follow-up appointment with perinatologist next week.  We discussed insulin management and risk of uncontrolled type 1 diabetes in pregnancy-maternal fetal risks were reviewed.  Nausea and vomiting is well controlled currently with Zofran  Encounter Diagnoses   Name Primary?   • Supervision of high risk pregnancy, antepartum    • Modified White class C pregestational diabetes mellitus    • Nausea and vomiting during pregnancy prior to 22 weeks gestation    • Type 1 diabetes mellitus without  complication (HCC)        1.  Pregnancy precautions and plan of care reviewed  2.  Continue current insulin dosages and follow-up for management with endocrinologist as scheduled  3.  NSTs: 2x/week to begin at 32 weeks.  4.  Continue prenatal vitamins.  5.  Watch weight gain.  Exercise at least 30 minutes daily  6.  Drink at least 2 liters of water daily.  7.  Encouraged prenatal classes.  8.  Follow-up in 2 weeks for obstetric follow-up.

## 2019-03-26 ENCOUNTER — TELEPHONE (OUTPATIENT)
Dept: OBGYN | Facility: CLINIC | Age: 24
End: 2019-03-26

## 2019-03-26 DIAGNOSIS — O28.0 ABNORMAL QUAD SCREEN: ICD-10-CM

## 2019-03-26 NOTE — TELEPHONE ENCOUNTER
Pt notified of abnormal AFP, explained to pt this is a risk test, and is not telling that the baby has any problems. Explained we have to verify her dates to make sure correlates with her LMP dates. If her dates are wrong the test may be wrong. Pt scheduled for Dating US on 3/28/19 at 1330. Pt agreed and verbalized understanding.     Referral on hold for dating scan.

## 2019-03-27 ENCOUNTER — TELEPHONE (OUTPATIENT)
Dept: OBGYN | Facility: CLINIC | Age: 24
End: 2019-03-27

## 2019-03-27 NOTE — TELEPHONE ENCOUNTER
Received VM from Yadi at [facility] stating she had genetic results  Returned call, spoke with Consuelo, she stated she had AFP results. Notified Consuelo we already have results. No further questions

## 2019-03-28 ENCOUNTER — ROUTINE PRENATAL (OUTPATIENT)
Dept: OBGYN | Facility: CLINIC | Age: 24
End: 2019-03-28
Payer: MEDICAID

## 2019-03-28 VITALS — WEIGHT: 127 LBS | SYSTOLIC BLOOD PRESSURE: 100 MMHG | DIASTOLIC BLOOD PRESSURE: 62 MMHG | BODY MASS INDEX: 21.13 KG/M2

## 2019-03-28 DIAGNOSIS — E10.9 TYPE 1 DIABETES MELLITUS WITHOUT COMPLICATION (HCC): ICD-10-CM

## 2019-03-28 PROCEDURE — 76815 OB US LIMITED FETUS(S): CPT | Performed by: OBSTETRICS & GYNECOLOGY

## 2019-03-28 NOTE — PROGRESS NOTES
S: Patient is a type I diabetic who presents today for dating ultrasound.  By last menstrual,  She is 17 weeks and 1 day gestation.  Patient has some nausea and rare emesis which is controlled with Zofran.  She has appointment with endocrinologist on Tuesday and has not will be follow-up on Thursday next week.  Patient is awaiting maternal fetal medicine appointment.  He has no complaints currently.      O:  VSS, AF    Transabdominal ultrasound was performed and read by me:  Anderson intrauterine pregnancy in variable presentation noted  Fetal heart rate was documented at 155 bpm  Amniotic fluid appears subjectively normal  Fetal biometry measurements give a gestational age 15 weeks and 6 days gestation with an EDC of 9/13/2019  No adnexal masses were noted    Impression: Intrauterine pregnancy at 15 weeks and 6 days gestation with EDC of 9/13/2019.  Findings reviewed      Assessment and plan:  Patient presents today for dating ultrasound  Ultrasound shows normal intrauterine pregnancy at 15 weeks and 6 days gestation  Precautions were discussed  Patient will continue diabetic treatment and endocrinology follow-up  Patient will follow-up in diabetic clinic next week  Precautions reviewed/plan of care discussed

## 2019-03-28 NOTE — PROGRESS NOTES
Pt here today for dating scan  Pt states no complaints   Reports +  Good # [phone]  Pharmacy Confirmed.  Chaperone offered and not indicated.   Pt has an appt with her Endocrinologist on 4/2/19   Pt has not made appt with Dr. Alvarado.

## 2019-04-04 ENCOUNTER — ROUTINE PRENATAL (OUTPATIENT)
Dept: OBGYN | Facility: CLINIC | Age: 24
End: 2019-04-04
Payer: MEDICAID

## 2019-04-04 VITALS — DIASTOLIC BLOOD PRESSURE: 64 MMHG | BODY MASS INDEX: 21.8 KG/M2 | WEIGHT: 131 LBS | SYSTOLIC BLOOD PRESSURE: 112 MMHG

## 2019-04-04 DIAGNOSIS — O24.011 TYPE 1 DIABETES MELLITUS COMPLICATING PREGNANCY IN FIRST TRIMESTER, ANTEPARTUM: ICD-10-CM

## 2019-04-04 DIAGNOSIS — O09.92 HIGH-RISK PREGNANCY SUPERVISION, SECOND TRIMESTER: ICD-10-CM

## 2019-04-04 DIAGNOSIS — O28.0 ABNORMAL ANTENATAL AFP SCREEN: ICD-10-CM

## 2019-04-04 DIAGNOSIS — O21.9 NAUSEA AND VOMITING DURING PREGNANCY PRIOR TO 22 WEEKS GESTATION: ICD-10-CM

## 2019-04-04 LAB — GLUCOSE BLD-MCNC: 158 MG/DL (ref 70–100)

## 2019-04-04 PROCEDURE — 82962 GLUCOSE BLOOD TEST: CPT | Performed by: OBSTETRICS & GYNECOLOGY

## 2019-04-04 PROCEDURE — 90040 PR PRENATAL FOLLOW UP: CPT | Performed by: OBSTETRICS & GYNECOLOGY

## 2019-04-04 NOTE — PROGRESS NOTES
Pt here today for OB follow up / Type 1 DM  Reports +FM  WT: 131 lb  BP: 112/64  Pt states has appt scheduled with Dr. Alvarado on 04/10/19  Random glucose check: 158 (40 minutes post lunch, pt states had a dex chicken salad)  Sees Sharri Ortiz at [facility] next follow up on 04/16/19  Pt did not bring log book today. Was instructed to bring log book at every visit  Good # [phone]

## 2019-04-04 NOTE — PROGRESS NOTES
S: Pt here for OB follow up. Doing well.   Reports fetal flutter.    negative vaginal bleeding.    negative leakage of fluid.    negative contractions.    Nausea and vomiting has mostly resolved   not bring blood sugar log today.  Reviewed diet.  Patient saw endocrinologist on Tuesday and states she has follow-up every 2 weeks.  Patient states her insulin dosage has not changed.  She had abnormal AFP and has appointment with perinatologist for fetal assessment and fetal echocardiogram scheduled next week.  Questions answered.    O: VSS Afeb      See prenatal vitals, chart for today's exam    FBS range:  1hr post prandial range:     Insulin regimen  NPH a.m. 50, regular a.m. 0  Regular with dinner 0, NPH at bedtime 36  Lispro carb ratio 1:10       A/P:  23 y.o.  18w1d with Type 1 DM who presents for obstetric follow-up. Doing better. N/V mostly resolved.  Abnormal AFP discussed-patient has appointment with perinatologist  Type 1 diabetes risk reviewed.  Maternal and fetal risks were reviewed.  Patient has perinatology ultrasound and fetal echocardiogram scheduled  Diabetes is currently managed by endocrinologist-patient reports she will have every 2-week follow-up  Encounter Diagnoses   Name Primary?   • High-risk pregnancy supervision, second trimester    • Type 1 diabetes mellitus complicating pregnancy in first trimester, antepartum    • Nausea and vomiting during pregnancy prior to 22 weeks gestation          1.  Pregnancy precautions and plan of care discussed  2.  Continue current insulin dosages and follow-up with endocrinologist for management  3.  NSTs: 2x/week to begin at 32 weeks.  4.  Continue prenatal vitamins.  5.  Watch weight gain.  Exercise at least 30 minutes daily  6.  Drink at least 2 liters of water daily.  7.  Encouraged prenatal classes.  8.  Follow-up in  2 weeks for obstetric follow-up.

## 2019-04-05 ENCOUNTER — TELEPHONE (OUTPATIENT)
Dept: OBGYN | Facility: CLINIC | Age: 24
End: 2019-04-05

## 2019-04-05 NOTE — TELEPHONE ENCOUNTER
Called [facility] to get a recalculation on an abnormal AFP for patient spoke to Elizabeth at [facility], Pt.'s ELIZABETH changed to 9/13/19 per U/S done by Dr. Navarro on 3/28/19. Elizabeth states she will give information to appropriate person and if she has any further questions she would call me back.

## 2019-04-11 ENCOUNTER — DOCUMENTATION (OUTPATIENT)
Dept: OBGYN | Facility: CLINIC | Age: 24
End: 2019-04-11

## 2019-04-11 NOTE — Clinical Note
Please let pt know her quad screen in now negative with the recalculation after her US.  Risk of down syndrome now 1:888

## 2019-04-12 NOTE — PROGRESS NOTES
Received updated QS results with new ELIZABETH    Now screen neg for DS (1: 888)    Eulalia Lainez MD  [facility], Women's Health

## 2019-04-18 ENCOUNTER — ROUTINE PRENATAL (OUTPATIENT)
Dept: OBGYN | Facility: CLINIC | Age: 24
End: 2019-04-18
Payer: MEDICAID

## 2019-04-18 VITALS — BODY MASS INDEX: 21.63 KG/M2 | WEIGHT: 130 LBS | SYSTOLIC BLOOD PRESSURE: 108 MMHG | DIASTOLIC BLOOD PRESSURE: 66 MMHG

## 2019-04-18 DIAGNOSIS — O21.9 NAUSEA AND VOMITING DURING PREGNANCY PRIOR TO 22 WEEKS GESTATION: ICD-10-CM

## 2019-04-18 DIAGNOSIS — O09.90 SUPERVISION OF HIGH RISK PREGNANCY, ANTEPARTUM: ICD-10-CM

## 2019-04-18 DIAGNOSIS — E10.9 TYPE 1 DIABETES MELLITUS WITHOUT COMPLICATION (HCC): ICD-10-CM

## 2019-04-18 PROCEDURE — 90040 PR PRENATAL FOLLOW UP: CPT | Performed by: OBSTETRICS & GYNECOLOGY

## 2019-04-18 NOTE — PROGRESS NOTES
T1 DM. OB F/U.  + fetal movement  Denies any VB, LO or UC's  Phone # [phone]  Pharmacy confirmed  Diabetic supplies: pt states her insurance is no longer covering her humolog only NPH

## 2019-04-18 NOTE — PROGRESS NOTES
S: Pt here for OB follow up. Doing well.   Reports normal fetal movements.    negative vaginal bleeding.    negative leakage of fluid.    negative contractions.    Reviewed blood sugar log with patient.  Reviewed diet.  Questions answered.    O: VSS Afeb      See prenatal vitals, chart for today's exam    FBS range:   1hr post prandial range:     Insulin regimen  NPH a.m. 50 , regular a.m. 0  Regular with dinner 0, NPH at bedtime 36  Lispro carb ratio 1:10   HgA1c 10.2      A/P:  23 y.o.  20w1d with  Class C DM who presents for obstetric follow-up.  Diabetes is not well controlled-patient has follow-up with endocrinology who is managing her diabetes.  Hemoglobin A1c was 10.2.  AFP was abnormal but was recalculated to be normal-results were sent to perinatologist today  Nausea and vomiting has mostly resolved  Patient has appointment with Dr. Alvarado on 2019 and with endocrinology on 2019  Encounter Diagnoses   Name Primary?   • Supervision of high risk pregnancy, antepartum    • Type 1 diabetes mellitus without complication (HCC)    • Nausea and vomiting during pregnancy prior to 22 weeks gestation        1.  Pregnancy precautions and plan of care discussed  2.  Continue current insulin and management per endocrinology  3.  NSTs: 2x/week to begin at 32 weeks.  4.  Continue prenatal vitamins.  5.  Watch weight gain.  Exercise at least 30 minutes daily  6.  Drink at least 2 liters of water daily.  7.  Encouraged prenatal classes.  8.  Follow-up in 2weeks for obstetric follow-up.

## 2019-04-19 ENCOUNTER — TELEPHONE (OUTPATIENT)
Dept: OBGYN | Facility: CLINIC | Age: 24
End: 2019-04-19

## 2019-04-19 NOTE — LETTER
April 29, 2019          Dear Nicolasa Curtis,      We have tried to reach you to speak to you about your plane of care. Please call us back.  One of the nurses is available to speak with you Monday through Friday, 8 a.m. to 4 p.m.    Please call us at [phone]; thank you for your prompt attention.        Sincerely,          Addie Canseco R.N.  Women's Health Clinical Supervisor

## 2019-04-19 NOTE — TELEPHONE ENCOUNTER
"19 1715 Pt's partner Hudson called yesterday upset stating that we keep changing her insulin and now the prescription we sent is not cover by her medicaid. Also stated we order pt's 24hr UA and when they went to Endo appt they told pt that she does not needed. Hudson asking why are we ordering something they do not need? Explained to Hudson that I will do some research since my provider is off for the day and I will call him tomorrow.     19 0900 Consulted with Dr. Navarro about pt's partner concerns and provider explained we did not change any of her insulin dosage and per MD this was discuss on her appt informing pt her Endo will be managing her Type I DM and we will be managing the rest of her pregnancy. Also Advised for pt to do 24hr UA, since her diabetes is uncontrol that puts her at a higher risk for preeclampsia and this test will give us a base line on how her kidneys are working. Also MD stated that he sent to pt's pharmacy insulin Rx pt informed MD she was on.     19 0936 called Hudson back but no answer. Left message to call us back at [phone].                1443 called Hudson again but no answer. Left message to call back at [phone] .     19 1135 called Hudson back sounded like someone picked up the phone and then hangup. Called back this time no answer. Left message to call back at [phone] .     19 1237 Hudson called back left a very rude message stating \"calling regarding Nicolasa Curtis  calling in reference of her medication  1995. You guys need to get on the mother fucken phone and start taking care of my mother fucken kid. I can't never get you mother fuckers on the phone. So if you guys want to start take this route, then I can go ahead file you guys for medical negligent, ok. Because this is getting ridiculous. I been calling and calling can't get insulin, can't get medical assistant on the phone. So Somebody needs to call me back my phone# is [phone]. " "And I'm not playing. I will take you to guys to court because this shit is getting ridiculous. You guys need to do you fucken job\".  1450 Called Hudson back but again no answer and another message left to call me back and explaining I left 2 messages on Friday 4/19/19 to call back and another message yesterday 4/22/19.     1520 Called [facility] on [address] where pt's insulin was sent and Spoke to Sadia and stated pt does have co-pay of 95 dollars. Ask Sadia this was my question because usually our medicaid patients don't have to pay a co-pay but it is running as [employer] HPN Medicaid. She stated the same thing and advised for pt to call her insurance.  Constance LOVE check pt's Medicaid today and pt is active for HPN copy scanned by Constance.  1554 called and sounded like someone picked up and then hangup.  Calling right back again no answer left message stating I wanted to talk to pt and him but unable. Explained pharmacy is seeing that pt's medicaid is running under [facility] medicaid for pharmacy benefits. What pharmacy is suggesting is for Nicolasa to call medicaid to find put why she has to pay this time for her insulin. To call me back at [phone].    1621 called pt Left message for pt to call back at [phone]. Explained I have been trying to reach Hudson but able to reach him. Explained I have left few messages for Hudson to call back.     4/24/19 1457 called pt again no answer left another message to call back.   4/29/19 Have not receive call back from patient/partner. No emergency contact listed. Will send certified letter today to informed them we need to talk to patient regarding her care.         "

## 2019-04-23 ENCOUNTER — TELEPHONE (OUTPATIENT)
Dept: OBGYN | Facility: CLINIC | Age: 24
End: 2019-04-23

## 2019-04-23 NOTE — TELEPHONE ENCOUNTER
"Received a VM from a male stating \"he was calling for Nicolasa Duckworthkley, reference to her medication,  96.  You guys need to get on the mother fucken ball and take care of my mother fucken kid, I can't ever get you mother fuckers on the phone, so if you guys wanna start take this route, then I can go ahead and file you guys for medical negligence, ok because this is getting ridiculous. I've been calling and calling can't get insulin, can't get a medical assistant on the phone, so somebody needs to call me back. My phone number is [phone] and I'm not playing, I will take you guys to court sophie this shit is getting ridiculous. You guys need to do your fucken job\".    Notified Geovanna, clinical supervisor about message.  "

## 2019-05-09 ENCOUNTER — ROUTINE PRENATAL (OUTPATIENT)
Dept: OBGYN | Facility: CLINIC | Age: 24
End: 2019-05-09
Payer: MEDICAID

## 2019-05-09 ENCOUNTER — NON-PROVIDER VISIT (OUTPATIENT)
Dept: OBGYN | Facility: CLINIC | Age: 24
End: 2019-05-09
Payer: MEDICAID

## 2019-05-09 VITALS — SYSTOLIC BLOOD PRESSURE: 104 MMHG | WEIGHT: 136 LBS | DIASTOLIC BLOOD PRESSURE: 52 MMHG | BODY MASS INDEX: 22.63 KG/M2

## 2019-05-09 DIAGNOSIS — E10.9 TYPE 1 DIABETES MELLITUS WITHOUT COMPLICATION (HCC): ICD-10-CM

## 2019-05-09 DIAGNOSIS — O24.019 TYPE 1 DIABETES MELLITUS DURING PREGNANCY, ANTEPARTUM: ICD-10-CM

## 2019-05-09 LAB — GLUCOSE BLD-MCNC: 95 MG/DL (ref 70–100)

## 2019-05-09 PROCEDURE — 82962 GLUCOSE BLOOD TEST: CPT | Performed by: OBSTETRICS & GYNECOLOGY

## 2019-05-09 PROCEDURE — 97803 MED NUTRITION INDIV SUBSEQ: CPT | Performed by: DIETITIAN, REGISTERED

## 2019-05-09 PROCEDURE — 90040 PR PRENATAL FOLLOW UP: CPT | Performed by: OBSTETRICS & GYNECOLOGY

## 2019-05-09 NOTE — PROGRESS NOTES
Pt here today for OB follow up  Reports +FM  WT: 136 lb  BP: 104/52  Random glucose chec: 95 ( 2 hours post lunch, pt states had a ham and cheese sandwich and a small bag of chips)  Pt states no complications or concerns today  Next follow up with Sharri Ortiz at [facility] on 05/18/2019  Good # [phone]

## 2019-05-09 NOTE — PROGRESS NOTES
Diabetic prenatal visit;    23w1d  Patient Active Problem List    Diagnosis Date Noted   • Abnormal  AFP screen 2019   • Nausea and vomiting during pregnancy prior to 22 weeks gestation 2019   • Type 1 diabetes mellitus without complication (HCC) 2019   • Supervision of high risk pregnancy, antepartum 2019       The patient presents for prenatal care. She is doing well. Denies contractions leakage of fluid or vaginal bleeding. Having good fetal movement    Vitals:    19 1318   BP: 104/52   Weight: 61.7 kg (136 lb)       Hemoglobin A1C; 10.2    Fasting sugars; did not bring fingerstick book but she states her fastings are in the 60s  One hour Postprandial sugars; 90s to 100 per patient    Random fingerstick 95    Size equals dates, normal fetal heart rate      Continue ADA diet      Insulin regimen;  Managed by Dr. Ortiz from endocrinology at the [facility]  Morning 50 units NPH, dinner 0  Bedtime 36 units NPH  Lispro carb ratio 1:10          NSTs twice weekly starting at 32 weeks if class AII GDM or worse  Increase fluid hydration to 2 L per day  Discussed proper shoes to be worn her pregnancy  Increase exercise daily walking 30 minutes per day  Delivery by 39 weeks if class AII GDM or worse  Discussed support hose use during pregnancy    Followup; 2 weeks

## 2019-05-09 NOTE — PROGRESS NOTES
Follow Up Nutrition Assessment:   Time: 1:20-1:50pm     Subjective:  -Pt states she is having low blood sugars x3 per week (<60) at around 6am   -She is not eating a bedtime snack and only eats breakfast about 4 days per week   -Is not hungry in AM   -Dinner is largest meals.   -Forgot log book and meter today     Objective:   Anthropometrics:   Today's Weight: 136lb   Pre-pregnancy weight: 115lb  Total weight gain: +21lb  Weeks gestation: 23w1d    Biochemical data, medical test and procedures:  Lab Results   Component Value Date/Time    POCGLUCOSE 95 05/09/2019 01:26 PM       Glucose Log Book (most recent):    Fasting glucose range: NA   1 hour post prandial glucose range: NA  Pt did not bring book or meter today   Has appt with Endo on 5/18     Assessment:   Recommended Diet:  5983-5788 calorie meal plan (see media for detailed meal plan)    Assessment Notes:   Unable to assess glycemia today bc no logbook or meter. Based on our discussion, Nicolsaa is not following the meal plan. She is skipping meals and snacks. Emphasized importance of eating 6 times a day; 3 meals and 3 snacks. Reviewed carb counting with pt using estimation, label reading and/or calorieking method. She reports taking Lispro 1:10g of carbs at all 3 meals and that this has not been changed by her endocrinologist at all. Explained that ongoing glucose monitoring is important as insulin resistance increases as pregnancy progresses. She will very likely need a tighter insulin to carb ratio as she enters 3rd trimester. Insulin is being managed by Endo at [facility]. Informed pt to call Endo today to inform them of hypoglycemia. If having any lows she was told to call Endo ASAP.       Plan: Monitoring & Evaluation  Goals:  1. Follow meal plan as outlined above; 3 meals and 3 snacks daily.   2. Check FSBS 4 times a day  (fasting, and 1 hour after each meal)  3. FSBS Goals= Fasting <90; 1 hour PP <130   4. Bring blood sugar log book to each appointment   5.  Appropriate weight gain during pregnancy  6. Do not skips breakfast or HS snack; consume daily   7. 15g carbs at breakfast; 60g at lunch; 60g at dinner; and 15g at snacks. Always with protein and/or fat  8. Measure carb portions for more accurate carb counting        Follow up 1-2 weeks   Miranda Zambrano RD, LD, CDE  [phone]

## 2019-05-21 ENCOUNTER — DOCUMENTATION (OUTPATIENT)
Dept: OBGYN | Facility: CLINIC | Age: 24
End: 2019-05-21

## 2019-05-21 NOTE — PROGRESS NOTES
[facility] Fetal Echo report:   Dr. Alvarado  There is biventricular hypertrophy and septal hypertrophy in this fetus.  Mother with type 1 diabetes.  The patient is to be seen again in 8 weeks for repeat ultrasound.  At this point there does not appear to be a need to transfer the patient from [facility].

## 2019-05-30 ENCOUNTER — HOSPITAL ENCOUNTER (EMERGENCY)
Facility: MEDICAL CENTER | Age: 24
End: 2019-05-30
Attending: EMERGENCY MEDICINE
Payer: MEDICAID

## 2019-05-30 ENCOUNTER — APPOINTMENT (OUTPATIENT)
Dept: RADIOLOGY | Facility: MEDICAL CENTER | Age: 24
End: 2019-05-30
Attending: EMERGENCY MEDICINE
Payer: MEDICAID

## 2019-05-30 VITALS
HEART RATE: 106 BPM | TEMPERATURE: 97.5 F | OXYGEN SATURATION: 98 % | DIASTOLIC BLOOD PRESSURE: 71 MMHG | WEIGHT: 133.38 LBS | HEIGHT: 65 IN | BODY MASS INDEX: 22.22 KG/M2 | SYSTOLIC BLOOD PRESSURE: 113 MMHG | RESPIRATION RATE: 17 BRPM

## 2019-05-30 DIAGNOSIS — R73.9 HYPERGLYCEMIA: ICD-10-CM

## 2019-05-30 DIAGNOSIS — Z34.92 SECOND TRIMESTER PREGNANCY: ICD-10-CM

## 2019-05-30 DIAGNOSIS — R11.2 NON-INTRACTABLE VOMITING WITH NAUSEA, UNSPECIFIED VOMITING TYPE: ICD-10-CM

## 2019-05-30 LAB
ALBUMIN SERPL BCP-MCNC: 3.7 G/DL (ref 3.2–4.9)
ALBUMIN/GLOB SERPL: 1.1 G/DL
ALP SERPL-CCNC: 68 U/L (ref 30–99)
ALT SERPL-CCNC: 7 U/L (ref 2–50)
ANION GAP SERPL CALC-SCNC: 10 MMOL/L (ref 0–11.9)
APPEARANCE UR: ABNORMAL
AST SERPL-CCNC: 8 U/L (ref 12–45)
B-OH-BUTYR SERPL-MCNC: 0.62 MMOL/L (ref 0.02–0.27)
BACTERIA #/AREA URNS HPF: ABNORMAL /HPF
BASE EXCESS BLDV CALC-SCNC: -2 MMOL/L
BASOPHILS # BLD AUTO: 0.4 % (ref 0–1.8)
BASOPHILS # BLD: 0.04 K/UL (ref 0–0.12)
BILIRUB SERPL-MCNC: 0.4 MG/DL (ref 0.1–1.5)
BILIRUB UR QL STRIP.AUTO: NEGATIVE
BODY TEMPERATURE: ABNORMAL CENTIGRADE
BUN SERPL-MCNC: 9 MG/DL (ref 8–22)
CALCIUM SERPL-MCNC: 9.2 MG/DL (ref 8.5–10.5)
CHLORIDE SERPL-SCNC: 100 MMOL/L (ref 96–112)
CO2 SERPL-SCNC: 24 MMOL/L (ref 20–33)
COLOR UR: YELLOW
CREAT SERPL-MCNC: 0.53 MG/DL (ref 0.5–1.4)
EOSINOPHIL # BLD AUTO: 0.07 K/UL (ref 0–0.51)
EOSINOPHIL NFR BLD: 0.7 % (ref 0–6.9)
EPI CELLS #/AREA URNS HPF: ABNORMAL /HPF
ERYTHROCYTE [DISTWIDTH] IN BLOOD BY AUTOMATED COUNT: 43.7 FL (ref 35.9–50)
GLOBULIN SER CALC-MCNC: 3.3 G/DL (ref 1.9–3.5)
GLUCOSE BLD-MCNC: 236 MG/DL (ref 65–99)
GLUCOSE BLD-MCNC: 301 MG/DL (ref 65–99)
GLUCOSE SERPL-MCNC: 324 MG/DL (ref 65–99)
GLUCOSE UR STRIP.AUTO-MCNC: >=1000 MG/DL
HCO3 BLDV-SCNC: 22 MMOL/L (ref 24–28)
HCT VFR BLD AUTO: 38 % (ref 37–47)
HGB BLD-MCNC: 12.8 G/DL (ref 12–16)
HYALINE CASTS #/AREA URNS LPF: ABNORMAL /LPF
IMM GRANULOCYTES # BLD AUTO: 0.02 K/UL (ref 0–0.11)
IMM GRANULOCYTES NFR BLD AUTO: 0.2 % (ref 0–0.9)
KETONES UR STRIP.AUTO-MCNC: ABNORMAL MG/DL
LEUKOCYTE ESTERASE UR QL STRIP.AUTO: NEGATIVE
LYMPHOCYTES # BLD AUTO: 2.12 K/UL (ref 1–4.8)
LYMPHOCYTES NFR BLD: 22.2 % (ref 22–41)
MAGNESIUM SERPL-MCNC: 1.9 MG/DL (ref 1.5–2.5)
MCH RBC QN AUTO: 31.6 PG (ref 27–33)
MCHC RBC AUTO-ENTMCNC: 33.7 G/DL (ref 33.6–35)
MCV RBC AUTO: 93.8 FL (ref 81.4–97.8)
MICRO URNS: ABNORMAL
MONOCYTES # BLD AUTO: 0.73 K/UL (ref 0–0.85)
MONOCYTES NFR BLD AUTO: 7.6 % (ref 0–13.4)
NEUTROPHILS # BLD AUTO: 6.58 K/UL (ref 2–7.15)
NEUTROPHILS NFR BLD: 68.9 % (ref 44–72)
NITRITE UR QL STRIP.AUTO: NEGATIVE
NRBC # BLD AUTO: 0 K/UL
NRBC BLD-RTO: 0 /100 WBC
PCO2 BLDV: 33.6 MMHG (ref 41–51)
PH BLDV: 7.43 [PH] (ref 7.31–7.45)
PH UR STRIP.AUTO: 6.5 [PH]
PHOSPHATE SERPL-MCNC: 3.4 MG/DL (ref 2.5–4.5)
PLATELET # BLD AUTO: 244 K/UL (ref 164–446)
PMV BLD AUTO: 11.1 FL (ref 9–12.9)
PO2 BLDV: 47.1 MMHG (ref 25–40)
POTASSIUM SERPL-SCNC: 3.9 MMOL/L (ref 3.6–5.5)
PROT SERPL-MCNC: 7 G/DL (ref 6–8.2)
PROT UR QL STRIP: NEGATIVE MG/DL
RBC # BLD AUTO: 4.05 M/UL (ref 4.2–5.4)
RBC # URNS HPF: ABNORMAL /HPF
RBC UR QL AUTO: NEGATIVE
SAO2 % BLDV: 82.3 %
SODIUM SERPL-SCNC: 134 MMOL/L (ref 135–145)
SP GR UR REFRACTOMETRY: >1.045
SP GR UR STRIP.AUTO: >=1.045
UROBILINOGEN UR STRIP.AUTO-MCNC: 1 MG/DL
WBC # BLD AUTO: 9.6 K/UL (ref 4.8–10.8)
WBC #/AREA URNS HPF: ABNORMAL /HPF

## 2019-05-30 PROCEDURE — 82962 GLUCOSE BLOOD TEST: CPT

## 2019-05-30 PROCEDURE — 96375 TX/PRO/DX INJ NEW DRUG ADDON: CPT

## 2019-05-30 PROCEDURE — 99285 EMERGENCY DEPT VISIT HI MDM: CPT

## 2019-05-30 PROCEDURE — 36415 COLL VENOUS BLD VENIPUNCTURE: CPT

## 2019-05-30 PROCEDURE — 83735 ASSAY OF MAGNESIUM: CPT

## 2019-05-30 PROCEDURE — 81001 URINALYSIS AUTO W/SCOPE: CPT

## 2019-05-30 PROCEDURE — 700105 HCHG RX REV CODE 258: Performed by: EMERGENCY MEDICINE

## 2019-05-30 PROCEDURE — 82010 KETONE BODYS QUAN: CPT

## 2019-05-30 PROCEDURE — 84100 ASSAY OF PHOSPHORUS: CPT

## 2019-05-30 PROCEDURE — 96374 THER/PROPH/DIAG INJ IV PUSH: CPT

## 2019-05-30 PROCEDURE — 85025 COMPLETE CBC W/AUTO DIFF WBC: CPT

## 2019-05-30 PROCEDURE — 82803 BLOOD GASES ANY COMBINATION: CPT

## 2019-05-30 PROCEDURE — 700111 HCHG RX REV CODE 636 W/ 250 OVERRIDE (IP): Performed by: EMERGENCY MEDICINE

## 2019-05-30 PROCEDURE — 80053 COMPREHEN METABOLIC PANEL: CPT

## 2019-05-30 PROCEDURE — 71045 X-RAY EXAM CHEST 1 VIEW: CPT

## 2019-05-30 RX ORDER — METOCLOPRAMIDE HYDROCHLORIDE 5 MG/ML
10 INJECTION INTRAMUSCULAR; INTRAVENOUS ONCE
Status: COMPLETED | OUTPATIENT
Start: 2019-05-30 | End: 2019-05-30

## 2019-05-30 RX ORDER — SODIUM CHLORIDE 9 MG/ML
1000 INJECTION, SOLUTION INTRAVENOUS ONCE
Status: COMPLETED | OUTPATIENT
Start: 2019-05-30 | End: 2019-05-30

## 2019-05-30 RX ORDER — METOCLOPRAMIDE 10 MG/1
10 TABLET ORAL 4 TIMES DAILY PRN
Qty: 20 TAB | Refills: 0 | Status: ON HOLD | OUTPATIENT
Start: 2019-05-30 | End: 2019-06-06

## 2019-05-30 RX ORDER — ONDANSETRON 2 MG/ML
4 INJECTION INTRAMUSCULAR; INTRAVENOUS ONCE
Status: COMPLETED | OUTPATIENT
Start: 2019-05-30 | End: 2019-05-30

## 2019-05-30 RX ADMIN — SODIUM CHLORIDE 1000 ML: 9 INJECTION, SOLUTION INTRAVENOUS at 11:56

## 2019-05-30 RX ADMIN — ONDANSETRON 4 MG: 2 INJECTION INTRAMUSCULAR; INTRAVENOUS at 13:50

## 2019-05-30 RX ADMIN — METOCLOPRAMIDE 10 MG: 5 INJECTION, SOLUTION INTRAMUSCULAR; INTRAVENOUS at 14:30

## 2019-05-30 NOTE — ED TRIAGE NOTES
Chief Complaint   Patient presents with   • High Blood Sugar     Blood sugar consistently in the 400's     Pt is 24 weeks pregnant.  Onset of sx was this am.  Attempted to contact OB without success

## 2019-05-30 NOTE — DISCHARGE INSTRUCTIONS
Follow-up with the pregnancy center as scheduled for you for reevaluation, medication management.    Continue home medications as previously indicated, including insulin.  Monitor blood glucose at meals and bedtime record results for primary care/OB/GYN review.    Reglan every 6 hours as needed for nausea or vomiting.    Encourage oral fluid hydration.  Advance diet as tolerated.    Return to the emergency department for abdominal pain, cramping, vaginal bleeding, intractable vomiting, persistent hypoglycemia, altered mental status, fever or other new concerns.

## 2019-05-30 NOTE — ED PROVIDER NOTES
ED Provider Note    CHIEF COMPLAINT  Chief Complaint   Patient presents with   • High Blood Sugar     Blood sugar consistently in the 400's       HPI  Nicolasa Curtis is a 23 y.o. female who presents to the emergency department through triage for high blood sugars.  Patient states she is 24 weeks pregnant, due September 2019, followed by the pregnancy center, but was unable to get a hold of them for recommendations today.  History of type 1 diabetes, does not have primary care, was previously buying her insulin over-the-counter in [place] before coming to [place].  She is now managed by the pregnancy center, states she takes NPH 50 units in the morning and 35 units every night.  Patient noticed elevated blood sugar early this morning, no improvement with NPH this morning.  Nausea, vomiting, with this pregnancy, daily.  No abdominal pain or cramping.  No vaginal bleeding.  No dysuria, urgency.  No flank pain.  Patient does describe some nasal congestion, nonproductive cough and mild sore throat this week.  Tactile fever 2 days ago.    REVIEW OF SYSTEMS  See HPI for further details. All other systems are negative.     PAST MEDICAL HISTORY   has a past medical history of Diabetes (HCC) and Nausea and vomiting during pregnancy prior to 22 weeks gestation (3/14/2019).    SOCIAL HISTORY  Social History     Social History Main Topics   • Smoking status: Never Smoker   • Smokeless tobacco: Never Used   • Alcohol use No   • Drug use: No   • Sexual activity: Yes     Partners: Male      Comment: Unplanned pregnancy. pt states was not on birth control       SURGICAL HISTORY  patient denies any surgical history    CURRENT MEDICATIONS  Home Medications     Reviewed by Chapito Canales (Pharmacy Tech) on 05/30/19 at 1154  Med List Status: Complete   Medication Last Dose Status   insulin NPH (HUMULIN,NOVOLIN) 100 UNIT/ML Suspension 5/30/2019 Active   Prenatal Multivit-Min-Fe-FA (PRENATAL VITAMINS PO) 5/30/2019 Active     "            ALLERGIES  No Known Allergies    PHYSICAL EXAM  VITAL SIGNS: /71   Pulse (!) 106   Temp 36.6 °C (97.8 °F) (Temporal)   Resp 15   Ht 1.651 m (5' 5\")   Wt 60.5 kg (133 lb 6.1 oz)   LMP 11/28/2018   SpO2 99%   BMI 22.20 kg/m²   Pulse ox interpretation: I interpret this pulse ox as normal.  Constitutional: Alert in no apparent distress.  HENT: Normocephalic, atraumatic. Bilateral external ears normal.  Audible nasal congestion.  Nose normal.  Dry mucous membranes.    Eyes: Pupils are equal and reactive, Conjunctiva normal.   Neck: Normal range of motion, Supple.  No meningeal irritation.  Lymphatic: No lymphadenopathy noted.   Cardiovascular: Mild tachycardia otherwise regular rate and rhythm, no murmurs. Distal pulses intact.  No peripheral edema.  Thorax & Lungs: Diminished bilateral bases, right greater than left, otherwise without wheezes rales or rhonchi.  No increased work of breathing.  Abdomen: Soft, non-distended.  Gravid at the umbilicus.  Nontender to palpation.  No CVA tenderness percussion.  Skin: Warm, Dry, No erythema, No rash.   Musculoskeletal: Good range of motion in all major joints.   Neurologic: Alert and oriented x4.  Speech clear cohesive.  Ambulates independently.  Psychiatric: Affect normal, Judgment normal, Mood normal.     DIAGNOSTIC STUDIES / PROCEDURES    LABS  Results for orders placed or performed during the hospital encounter of 05/30/19   CBC w/ Differential   Result Value Ref Range    WBC 9.6 4.8 - 10.8 K/uL    RBC 4.05 (L) 4.20 - 5.40 M/uL    Hemoglobin 12.8 12.0 - 16.0 g/dL    Hematocrit 38.0 37.0 - 47.0 %    MCV 93.8 81.4 - 97.8 fL    MCH 31.6 27.0 - 33.0 pg    MCHC 33.7 33.6 - 35.0 g/dL    RDW 43.7 35.9 - 50.0 fL    Platelet Count 244 164 - 446 K/uL    MPV 11.1 9.0 - 12.9 fL    Neutrophils-Polys 68.90 44.00 - 72.00 %    Lymphocytes 22.20 22.00 - 41.00 %    Monocytes 7.60 0.00 - 13.40 %    Eosinophils 0.70 0.00 - 6.90 %    Basophils 0.40 0.00 - 1.80 %    " Immature Granulocytes 0.20 0.00 - 0.90 %    Nucleated RBC 0.00 /100 WBC    Neutrophils (Absolute) 6.58 2.00 - 7.15 K/uL    Lymphs (Absolute) 2.12 1.00 - 4.80 K/uL    Monos (Absolute) 0.73 0.00 - 0.85 K/uL    Eos (Absolute) 0.07 0.00 - 0.51 K/uL    Baso (Absolute) 0.04 0.00 - 0.12 K/uL    Immature Granulocytes (abs) 0.02 0.00 - 0.11 K/uL    NRBC (Absolute) 0.00 K/uL   Complete Metabolic Panel (CMP)   Result Value Ref Range    Sodium 134 (L) 135 - 145 mmol/L    Potassium 3.9 3.6 - 5.5 mmol/L    Chloride 100 96 - 112 mmol/L    Co2 24 20 - 33 mmol/L    Anion Gap 10.0 0.0 - 11.9    Glucose 324 (H) 65 - 99 mg/dL    Bun 9 8 - 22 mg/dL    Creatinine 0.53 0.50 - 1.40 mg/dL    Calcium 9.2 8.5 - 10.5 mg/dL    AST(SGOT) 8 (L) 12 - 45 U/L    ALT(SGPT) 7 2 - 50 U/L    Alkaline Phosphatase 68 30 - 99 U/L    Total Bilirubin 0.4 0.1 - 1.5 mg/dL    Albumin 3.7 3.2 - 4.9 g/dL    Total Protein 7.0 6.0 - 8.2 g/dL    Globulin 3.3 1.9 - 3.5 g/dL    A-G Ratio 1.1 g/dL   Urinalysis, culture if indicated   Result Value Ref Range    Color Yellow     Character Cloudy (A)     Specific Gravity >=1.045 (A) <1.035    Ph 6.5 5.0 - 8.0    Glucose >=1000 (A) Negative mg/dL    Ketones Trace (A) Negative mg/dL    Protein Negative Negative mg/dL    Bilirubin Negative Negative    Urobilinogen, Urine 1.0 Negative    Nitrite Negative Negative    Leukocyte Esterase Negative Negative    Occult Blood Negative Negative    Micro Urine Req Microscopic    MAGNESIUM   Result Value Ref Range    Magnesium 1.9 1.5 - 2.5 mg/dL   PHOSPHORUS   Result Value Ref Range    Phosphorus 3.4 2.5 - 4.5 mg/dL   BETA-HYDROXYBUTYRIC ACID   Result Value Ref Range    beta-Hydroxybutyric Acid 0.62 (H) 0.02 - 0.27 mmol/L   VENOUS BLOOD GAS   Result Value Ref Range    Venous Bg Ph 7.43 7.31 - 7.45    Venous Bg Pco2 33.6 (L) 41.0 - 51.0 mmHg    Venous Bg Po2 47.1 (H) 25.0 - 40.0 mmHg    Venous Bg O2 Saturation 82.3 %    Venous Bg Hco3 22 (L) 24 - 28 mmol/L    Venous Bg Base Excess -2  mmol/L    Body Temp see below Centigrade   ESTIMATED GFR   Result Value Ref Range    GFR If African American >60 >60 mL/min/1.73 m 2    GFR If Non African American >60 >60 mL/min/1.73 m 2   REFRACTOMETER SG   Result Value Ref Range    Specific Gravity >1.045    URINE MICROSCOPIC (W/UA)   Result Value Ref Range    WBC 0-2 /hpf    RBC Rare /hpf    Bacteria Few (A) None /hpf    Epithelial Cells Few /hpf    Hyaline Cast 0-2 /lpf   ACCU-CHEK GLUCOSE   Result Value Ref Range    Glucose - Accu-Ck 301 (H) 65 - 99 mg/dL   ACCU-CHEK GLUCOSE   Result Value Ref Range    Glucose - Accu-Ck 236 (H) 65 - 99 mg/dL     IMAGING  DX-CHEST-PORTABLE (1 VIEW)   Final Result      No acute cardiopulmonary disease.          COURSE & MEDICAL DECISION MAKING  Nursing notes and vital signs were reviewed. (See chart for details)  The patients records were reviewed, history was obtained from the patient and her significant other;     Evaluation demonstrates hyperglycemia, although no evidence for DKA.  Labs otherwise quite unrevealing, no leukocytosis, left shift or bandemia.  No electrolyte derangement.  Urinalysis quite unremarkable though she does have few bacteria, this will be treated given pregnancy.  No abdominal pain or cramping, no vaginal bleeding, fetal heart tones 140.  Abdominal exam is otherwise benign.  Vital signs are stable without fever significant tachycardia.  Never hypotensive.  Symptomatology consistent with environmental allergy versus viral URI.  No clinical or radiographic evidence for pneumonia.  Nausea without vomiting, little improvement with Zofran, patient is tried this throughout this pregnancy without relief.  Will try Reglan.    Patient feeling better after Reglan.  Tolerating ice chips and water without recurrent nausea or vomiting.    Patient is stable for discharge at this time, anticipatory guidance provided, Reglan for nausea vomiting, close follow-up is encouraged with the pregnancy center as arranged for  her, and strict ED return instructions have been detailed. Patient is agreeable to the disposition and plan.    Patient's blood pressure was elevated in the emergency department, and has been referred to primary care for close monitoring.    FINAL IMPRESSION  (R73.9) Hyperglycemia  (R11.2) Non-intractable vomiting with nausea, unspecified vomiting type  (Z34.92) Second trimester pregnancy      Electronically signed by: Eulalia Aparicio, 5/30/2019 11:30 AM      This dictation was created using voice recognition software. The accuracy of the dictation is limited to the abilities of the software. I expect there may be some errors of grammar and possibly content. The nursing notes were reviewed and certain aspects of this information were incorporated into this note.

## 2019-05-30 NOTE — ED NOTES
Discharged home will follow up with pregnancy center as scheduled in the am. Pt understands to return to ed with worsening symptoms or uncontrolled glucose. Pt given prescription x 1 with indication.

## 2019-06-06 ENCOUNTER — ROUTINE PRENATAL (OUTPATIENT)
Dept: OBGYN | Facility: CLINIC | Age: 24
End: 2019-06-06
Payer: MEDICAID

## 2019-06-06 ENCOUNTER — HOSPITAL ENCOUNTER (INPATIENT)
Facility: MEDICAL CENTER | Age: 24
LOS: 2 days | DRG: 833 | End: 2019-06-08
Attending: OBSTETRICS & GYNECOLOGY | Admitting: OBSTETRICS & GYNECOLOGY
Payer: MEDICAID

## 2019-06-06 ENCOUNTER — NON-PROVIDER VISIT (OUTPATIENT)
Dept: OBGYN | Facility: CLINIC | Age: 24
End: 2019-06-06
Payer: MEDICAID

## 2019-06-06 VITALS — SYSTOLIC BLOOD PRESSURE: 104 MMHG | BODY MASS INDEX: 22.63 KG/M2 | DIASTOLIC BLOOD PRESSURE: 60 MMHG | WEIGHT: 136 LBS

## 2019-06-06 DIAGNOSIS — E10.9 TYPE 1 DIABETES MELLITUS WITHOUT COMPLICATION (HCC): ICD-10-CM

## 2019-06-06 DIAGNOSIS — O24.019 TYPE 1 DIABETES MELLITUS DURING PREGNANCY, ANTEPARTUM: ICD-10-CM

## 2019-06-06 LAB
ALBUMIN SERPL BCP-MCNC: 3.2 G/DL (ref 3.2–4.9)
ALBUMIN/GLOB SERPL: 0.9 G/DL
ALP SERPL-CCNC: 61 U/L (ref 30–99)
ALT SERPL-CCNC: 6 U/L (ref 2–50)
AMPHET UR QL SCN: NEGATIVE
ANION GAP SERPL CALC-SCNC: 11 MMOL/L (ref 0–11.9)
APPEARANCE UR: ABNORMAL
AST SERPL-CCNC: 9 U/L (ref 12–45)
BACTERIA #/AREA URNS HPF: ABNORMAL /HPF
BARBITURATES UR QL SCN: NEGATIVE
BASOPHILS # BLD AUTO: 0.2 % (ref 0–1.8)
BASOPHILS # BLD: 0.03 K/UL (ref 0–0.12)
BENZODIAZ UR QL SCN: NEGATIVE
BILIRUB SERPL-MCNC: 0.7 MG/DL (ref 0.1–1.5)
BILIRUB UR QL STRIP.AUTO: NEGATIVE
BUN SERPL-MCNC: 10 MG/DL (ref 8–22)
BZE UR QL SCN: NEGATIVE
CALCIUM SERPL-MCNC: 8.3 MG/DL (ref 8.5–10.5)
CANNABINOIDS UR QL SCN: POSITIVE
CHLORIDE SERPL-SCNC: 100 MMOL/L (ref 96–112)
CO2 SERPL-SCNC: 20 MMOL/L (ref 20–33)
COLOR UR: YELLOW
CREAT SERPL-MCNC: 0.52 MG/DL (ref 0.5–1.4)
EOSINOPHIL # BLD AUTO: 0.02 K/UL (ref 0–0.51)
EOSINOPHIL NFR BLD: 0.2 % (ref 0–6.9)
EPI CELLS #/AREA URNS HPF: ABNORMAL /HPF
ERYTHROCYTE [DISTWIDTH] IN BLOOD BY AUTOMATED COUNT: 44.6 FL (ref 35.9–50)
GLOBULIN SER CALC-MCNC: 3.4 G/DL (ref 1.9–3.5)
GLUCOSE BLD-MCNC: 208 MG/DL (ref 65–99)
GLUCOSE BLD-MCNC: 219 MG/DL (ref 70–100)
GLUCOSE BLD-MCNC: 229 MG/DL (ref 65–99)
GLUCOSE SERPL-MCNC: 254 MG/DL (ref 65–99)
GLUCOSE UR STRIP.AUTO-MCNC: >=1000 MG/DL
HCT VFR BLD AUTO: 35.6 % (ref 37–47)
HGB BLD-MCNC: 11.7 G/DL (ref 12–16)
HYALINE CASTS #/AREA URNS LPF: ABNORMAL /LPF
IMM GRANULOCYTES # BLD AUTO: 0.07 K/UL (ref 0–0.11)
IMM GRANULOCYTES NFR BLD AUTO: 0.5 % (ref 0–0.9)
KETONES UR STRIP.AUTO-MCNC: >=160 MG/DL
LEUKOCYTE ESTERASE UR QL STRIP.AUTO: ABNORMAL
LYMPHOCYTES # BLD AUTO: 2.67 K/UL (ref 1–4.8)
LYMPHOCYTES NFR BLD: 20.6 % (ref 22–41)
MCH RBC QN AUTO: 31.7 PG (ref 27–33)
MCHC RBC AUTO-ENTMCNC: 32.9 G/DL (ref 33.6–35)
MCV RBC AUTO: 96.5 FL (ref 81.4–97.8)
METHADONE UR QL SCN: NEGATIVE
MICRO URNS: ABNORMAL
MONOCYTES # BLD AUTO: 1.22 K/UL (ref 0–0.85)
MONOCYTES NFR BLD AUTO: 9.4 % (ref 0–13.4)
NEUTROPHILS # BLD AUTO: 8.95 K/UL (ref 2–7.15)
NEUTROPHILS NFR BLD: 69.1 % (ref 44–72)
NITRITE UR QL STRIP.AUTO: NEGATIVE
NRBC # BLD AUTO: 0 K/UL
NRBC BLD-RTO: 0 /100 WBC
OPIATES UR QL SCN: NEGATIVE
OXYCODONE UR QL SCN: NEGATIVE
PCP UR QL SCN: NEGATIVE
PH UR STRIP.AUTO: 6 [PH]
PLATELET # BLD AUTO: 212 K/UL (ref 164–446)
PMV BLD AUTO: 11.6 FL (ref 9–12.9)
POTASSIUM SERPL-SCNC: 4 MMOL/L (ref 3.6–5.5)
PROPOXYPH UR QL SCN: NEGATIVE
PROT SERPL-MCNC: 6.6 G/DL (ref 6–8.2)
PROT UR QL STRIP: NEGATIVE MG/DL
RBC # BLD AUTO: 3.69 M/UL (ref 4.2–5.4)
RBC # URNS HPF: ABNORMAL /HPF
RBC UR QL AUTO: NEGATIVE
SODIUM SERPL-SCNC: 131 MMOL/L (ref 135–145)
SP GR UR STRIP.AUTO: 1.04
UROBILINOGEN UR STRIP.AUTO-MCNC: 1 MG/DL
WBC # BLD AUTO: 13 K/UL (ref 4.8–10.8)
WBC #/AREA URNS HPF: ABNORMAL /HPF

## 2019-06-06 PROCEDURE — 80053 COMPREHEN METABOLIC PANEL: CPT

## 2019-06-06 PROCEDURE — 97803 MED NUTRITION INDIV SUBSEQ: CPT | Performed by: DIETITIAN, REGISTERED

## 2019-06-06 PROCEDURE — 3E0234Z INTRODUCTION OF SERUM, TOXOID AND VACCINE INTO MUSCLE, PERCUTANEOUS APPROACH: ICD-10-PCS | Performed by: OBSTETRICS & GYNECOLOGY

## 2019-06-06 PROCEDURE — 81001 URINALYSIS AUTO W/SCOPE: CPT

## 2019-06-06 PROCEDURE — 59025 FETAL NON-STRESS TEST: CPT | Performed by: OBSTETRICS & GYNECOLOGY

## 2019-06-06 PROCEDURE — 700102 HCHG RX REV CODE 250 W/ 637 OVERRIDE(OP): Performed by: OBSTETRICS & GYNECOLOGY

## 2019-06-06 PROCEDURE — 90471 IMMUNIZATION ADMIN: CPT | Performed by: OBSTETRICS & GYNECOLOGY

## 2019-06-06 PROCEDURE — 80307 DRUG TEST PRSMV CHEM ANLYZR: CPT

## 2019-06-06 PROCEDURE — 90040 PR PRENATAL FOLLOW UP: CPT | Performed by: OBSTETRICS & GYNECOLOGY

## 2019-06-06 PROCEDURE — 85025 COMPLETE CBC W/AUTO DIFF WBC: CPT

## 2019-06-06 PROCEDURE — 770002 HCHG ROOM/CARE - OB PRIVATE (112)

## 2019-06-06 PROCEDURE — 99233 SBSQ HOSP IP/OBS HIGH 50: CPT | Mod: 25 | Performed by: OBSTETRICS & GYNECOLOGY

## 2019-06-06 PROCEDURE — 36415 COLL VENOUS BLD VENIPUNCTURE: CPT

## 2019-06-06 PROCEDURE — 82962 GLUCOSE BLOOD TEST: CPT

## 2019-06-06 PROCEDURE — 59025 FETAL NON-STRESS TEST: CPT | Mod: 26 | Performed by: OBSTETRICS & GYNECOLOGY

## 2019-06-06 PROCEDURE — 302790 HCHG STAT ANTEPARTUM CARE, DAILY

## 2019-06-06 PROCEDURE — 90715 TDAP VACCINE 7 YRS/> IM: CPT | Performed by: OBSTETRICS & GYNECOLOGY

## 2019-06-06 PROCEDURE — 82962 GLUCOSE BLOOD TEST: CPT | Performed by: OBSTETRICS & GYNECOLOGY

## 2019-06-06 RX ADMIN — INSULIN LISPRO 10 UNITS: 100 INJECTION, SOLUTION INTRAVENOUS; SUBCUTANEOUS at 20:35

## 2019-06-06 ASSESSMENT — COPD QUESTIONNAIRES
DURING THE PAST 4 WEEKS HOW MUCH DID YOU FEEL SHORT OF BREATH: NONE/LITTLE OF THE TIME
HAVE YOU SMOKED AT LEAST 100 CIGARETTES IN YOUR ENTIRE LIFE: NO/DON'T KNOW
IN THE PAST 12 MONTHS DO YOU DO LESS THAN YOU USED TO BECAUSE OF YOUR BREATHING PROBLEMS: DISAGREE/UNSURE
DO YOU EVER COUGH UP ANY MUCUS OR PHLEGM?: NO/ONLY WITH OCCASIONAL COLDS OR INFECTIONS
COPD SCREENING SCORE: 0

## 2019-06-06 ASSESSMENT — LIFESTYLE VARIABLES
ALCOHOL_USE: NO
EVER_SMOKED: NEVER

## 2019-06-06 ASSESSMENT — PATIENT HEALTH QUESTIONNAIRE - PHQ9
SUM OF ALL RESPONSES TO PHQ9 QUESTIONS 1 AND 2: 0
2. FEELING DOWN, DEPRESSED, IRRITABLE, OR HOPELESS: NOT AT ALL
1. LITTLE INTEREST OR PLEASURE IN DOING THINGS: NOT AT ALL

## 2019-06-06 NOTE — PROGRESS NOTES
OB f/u GDM. Good #[phone]  Good FM  Pt. Denies VB, LOF, or UC's.   Pharmacy verified.  Diabetic supplies  BS in clinic : 219 Pt states last ate at 12:30  Chaperone offered and Provided  Tdap vaccine given. Right Deltoid. VIS given and screening check list reviewed with pt. Vaccine verified by Marta

## 2019-06-06 NOTE — PROGRESS NOTES
Follow Up Nutrition Assessment:   Time: 3:45-4:16pm     Subjective:  -Pt states that [facility] endocrinology could not see her again until August. She was not aware that Sharri Ortiz is no longer at [facility] and they do not have an endocrinologist.   -Pt did not bring her log book . Had her meter but has not checked FSBS in the past week since 5/30 when she had readings in 400's and went to the hospital ([phone] on 5/30/19). Next readings on the meter was 5/29 438, and then on 5/9 a reading of 51   -She states she is out of test strips and needs more. MA ordered more strips and NPH insulin today.   -Pt states she is not taking Lispro (although last visit on 5/9/19 she told me she was taking Lispro) because it was not covered by insurance when we sent it to the pharmacy.   -Pt's significant other called Medicaid during our visit and they informed him that Humalog/Lispro is a covered insulin but needs a prior authorization.   -She c/o N/V every morning for the first few hours of the day. Not able to keep food down. Reports vomiting x4 per day.     Objective:   Anthropometrics:   Today's Weight: 136lb   Pre-pregnancy weight: 115lb  Total weight gain: +21lb  Weeks gestation: 27w1d    Biochemical data, medical test and procedures:  Lab Results   Component Value Date/Time    POCGLUCOSE 219 (A) 06/06/2019 03:07 PM       Glucose Log Book (most recent):  Not checking FSBS as instructed    Fasting glucose range: NA   1 hour post prandial glucose range: NA     Assessment:   Recommended Diet:  1800 calorie meal plan (see media for detailed meal plan)    Assessment Notes:   Pt is no longer able to see an Endocrinologist. Not able to adjust insulin today bc there are not any glucose readings to assess. The main problem is that she is not taking any short acting insulin w/ meals. We are working on a prior auth to get the Lispro/Humalog covered. Auth needs to be sent to [phone] (passed this information along to Bess JEONG,  clinic manager).   Pt was sent to L&D today bc of urine test showing high ketones, N/V, unable to eat and insulin management.   Pt will need to put up insulin NPH and Humalog/Lispro from pharmacy upon leaving L & D and take as prescribed by Endocrinologist.   Pt states Endo at [facility] did not change her insulin at her last appt there --- NPH 50 units q AM, NPH 36 units q HS, and Lispro 1:10g carbs at each meal.     Plan: Monitoring & Evaluation  Goals:  1. Follow meal plan as outlined above; 3 meals and 3 snacks daily.   2. Check FSBS 4 times a day  (fasting, and 1 hour after each meal)  3. FSBS Goals= Fasting <90; 1 hour PP <130   4. Bring blood sugar log book to each appointment   5. Appropriate weight gain during pregnancy  6. Proceed to L&D for insulin management and nausea management   7. Pt needs to get insulin , both NPH and Humalog       Follow up 1 week   Miranda Zambrano, RD, LD, CDE  [phone]

## 2019-06-06 NOTE — LETTER
"Count Your Baby's Movements  Another step to a healthy delivery    Nicolasa Curtis,             Dept: [phone]    How Many Weeks Pregnant? Unknown    Date to Begin Countin19              How to use this chart    One way for your physician to keep track of your baby's health is by knowing how often the baby moves (or \"kicks\") in your womb.  You can help your physician to do this by using this chart every day.    Every day, you should see how many hours it takes for your baby to move 10 times.  Start in the morning, as soon as you get up.    · First, write down the time your baby moves until you get to 10.  · Check off one box every time your baby moves until you get to 10.  · Write down the time you finished counting in the last column.  · Total how long it took to count up all 10 movements.  · Finally, fill in the box that shows how long this took.  After counting 10 movements, you no longer have to count any more that day.  The next morning, just start counting again as soon as you get up.    What should you call a \"movement\"?  It is hard to say, because it will feel different from one mother to another and from one pregnancy to the next.  The important thing is that you count the movements the same way throughout your pregnancy.  If you have more questions, you should ask your physician.    Count carefully every day!  SAMPLE:  Week 28    How many hours did it take to feel 10 movements?       Start  Time     1     2     3     4     5     6     7     8     9     10   Finish Time   Mon 8:20 ·  ·  ·  ·  ·  ·  ·  ·  ·  ·  11:40                  Sat               Sun                 IMPORTANT: You should contact your physician if it takes more than two hours for you to feel 10 movements.  Each morning, write down the time and start to count the movements of your baby.  Keep track by checking off one box every time you feel one movement.  When you have " "felt 10 \"kicks\", write down the time you finished counting in the last column.  Then fill in the   box (over the check farooq) for the number of hours it took.  Be sure to read the complete instructions on the previous page.            "

## 2019-06-06 NOTE — PROGRESS NOTES
Diabetic prenatal visit;    27w1d  Patient Active Problem List    Diagnosis Date Noted   • Abnormal  AFP screen 2019   • Nausea and vomiting during pregnancy prior to 22 weeks gestation 2019   • Type 1 diabetes mellitus without complication (HCC) 2019   • Supervision of high risk pregnancy, antepartum 2019       The patient presents for prenatal care. She is doing well. Denies contractions leakage of fluid or vaginal bleeding. Having good fetal movement    Patient states she is been having constant vomiting over the last several days to weeks.    Vitals:    19 1501   BP: 104/60   Weight: 61.7 kg (136 lb)       Hemoglobin A1C; 10.2    Fasting sugars; patient has not been testing sugars does not have logbook-we reviewed her meter and last sugars were tested on 2019 sugars running 300 -400 at that time.  One hour Postprandial sugars; patient has not been testing sugars does not have logbook    Random fingerstick 219    Urine dip  Positive glucose 500 mg/dL  Positive ketones greater than 160 mg/dL  Trace protein    Size equals dates, normal fetal heart rate      Continue ADA diet  AFP normal    Fetal cardiac echo shows biventricular hypertrophy and septal hypertrophy on May 1, 2019    Type 1 diabetes mellitus poorly treated patient stopped lispro-insurance does not cover it.  She states she still taking her NPH insulin.  The patient did not bring her fingerstick book.  The patient has not been coming into her appointments as scheduled.  Her last visit was 4 weeks ago.  Patient states she has intractable nausea and vomiting.          Patient needs inpatient admission for further work-up and evaluation  Recommend M consultation immediately.    Sharri Ortiz  from endocrinology at the [place] has left abruptly-she has previously seen this patient x2 visits     We have made recommendations with [facility] to follow this patient after she is discharged from the  hospital referral is been placed and they state they will accept Medicaid on her behalf as an outpatient.    NSTs twice weekly starting at 32 weeks if class AII GDM or worse      Delivery by 39 weeks if class AII GDM or worse      After the patient is discharged from the hospital she will be followed by our clinic and [facility] endocrinology.  Elli Farooq (pregnancy ) has made arrangements with [facility] endocrinology to accept this patient for outpatient evaluation and treatment as needed.        Extensive discussion with the patient and the father of the baby as far as the risks of uncontrolled diabetes during pregnancy.  I have counseled them that they need to follow medical advice as closely as possible to ensure healthy pregnancy for her and her unborn fetus.  Patient is at greater risk for having a stillbirth as well as congenital birth anomalies from elevated hemoglobin A1c.  In addition, her baby is at risk for protracted NICU admission after birth due to metabolic abnormalities as well as congenital birth defects.    Father the baby is very upset and states that he cannot afford the insulin or any other medications we are prescribing.  He is very upset that the lab has lost some of the blood test results.  He is also very upset that our clinic has changed her due date.  He also states that the reason she has not come into the clinic is because we counseled her as to the risks of uncontrolled diabetes in pregnancy and it makes her feel  sad.  I reassured him that we need to work together to provide a safe and healthy environment for her unborn fetus and for the patient.    Case discussed with Eulalia Urban MD as well as charge nurse Tex JEONG at 1615 hrs. today

## 2019-06-07 LAB
GLUCOSE BLD-MCNC: 106 MG/DL (ref 65–99)
GLUCOSE BLD-MCNC: 111 MG/DL (ref 65–99)
GLUCOSE BLD-MCNC: 132 MG/DL (ref 65–99)
GLUCOSE BLD-MCNC: 140 MG/DL (ref 65–99)
GLUCOSE BLD-MCNC: 152 MG/DL (ref 65–99)
GLUCOSE BLD-MCNC: 173 MG/DL (ref 65–99)
GLUCOSE BLD-MCNC: 94 MG/DL (ref 65–99)

## 2019-06-07 PROCEDURE — 82962 GLUCOSE BLOOD TEST: CPT | Mod: 91

## 2019-06-07 PROCEDURE — 700102 HCHG RX REV CODE 250 W/ 637 OVERRIDE(OP): Performed by: OBSTETRICS & GYNECOLOGY

## 2019-06-07 PROCEDURE — 99231 SBSQ HOSP IP/OBS SF/LOW 25: CPT | Mod: 25 | Performed by: OBSTETRICS & GYNECOLOGY

## 2019-06-07 PROCEDURE — 302790 HCHG STAT ANTEPARTUM CARE, DAILY

## 2019-06-07 PROCEDURE — 59025 FETAL NON-STRESS TEST: CPT

## 2019-06-07 PROCEDURE — 770002 HCHG ROOM/CARE - OB PRIVATE (112)

## 2019-06-07 PROCEDURE — 59025 FETAL NON-STRESS TEST: CPT | Mod: 26 | Performed by: OBSTETRICS & GYNECOLOGY

## 2019-06-07 PROCEDURE — A9270 NON-COVERED ITEM OR SERVICE: HCPCS | Performed by: OBSTETRICS & GYNECOLOGY

## 2019-06-07 RX ORDER — AMOXICILLIN AND CLAVULANATE POTASSIUM 500; 125 MG/1; MG/1
1 TABLET, FILM COATED ORAL 2 TIMES DAILY
Status: DISCONTINUED | OUTPATIENT
Start: 2019-06-07 | End: 2019-06-08 | Stop reason: HOSPADM

## 2019-06-07 RX ADMIN — AMOXICILLIN AND CLAVULANATE POTASSIUM 1 TABLET: 500; 125 TABLET, FILM COATED ORAL at 13:05

## 2019-06-07 RX ADMIN — INSULIN LISPRO 5 UNITS: 100 INJECTION, SOLUTION INTRAVENOUS; SUBCUTANEOUS at 17:36

## 2019-06-07 NOTE — CARE PLAN
Problem: Risk for Infection, Impaired Wound Healing  Goal: Remain free from signs and symptoms of infection  Outcome: PROGRESSING AS EXPECTED  Patient remains afebrile. No S&S of infections     Problem: Pain  Goal: Alleviation of Pain or a reduction in pain to the patient's comfort goal  Outcome: PROGRESSING AS EXPECTED  Patient denies any pain or contractions. Patient educated to notify RN if any pain or contractions develop

## 2019-06-07 NOTE — PROGRESS NOTES
"Alyssa Curtis   26w0d  Admission DX: Pregnancy  Hyperglycemia due to type 1 diabetes mellitus (HCC)    Subjective:     Pt reports pain in both ears, wondering if she has an ear infection. No fevers, no cough.      uterine contractions:no  pain: .no  LOF: no  vaginal Bleeding: no  fetal movement: normal    Objective:   Vitals:    19 1800 19 2013 19 0007 19 0407   BP: 125/75 123/74 128/78 129/76   Pulse: (!) 109 (!) 113 (!) 110 (!) 110   Resp: 16 16 16 16   Temp: 36.2 °C (97.2 °F) 36.7 °C (98.1 °F) 36.1 °C (96.9 °F) 36.3 °C (97.3 °F)   TempSrc: Temporal Temporal Temporal Temporal   Weight: 61.7 kg (136 lb)      Height: 1.651 m (5' 5\")        Gen: AAO, NAD  Abdomen: gravid, soft, NT  Ext: SCDs on, Nt, no cyanosis or clubbing    Otoscopic exam by Dr. Huerta:  Left bulging TM with erythema, opacified, significant tenderness during exam      Alyssa Curtis, a  at 26w0d with an ELIZABETH of 2019, Alternate ELIZABETH Entry, was seen at [facility] for a nonstress test.    Nonstress Test  Reason for NST: Antepartum  Variability: Moderate  Decelerations: None  Accelerations: Yes  Acoustic Stimulator: No  Baseline: 150  Uterine Irritability: No  Contractions: Not present  Nonstress Test Interpretation  $ Nonstress Test Interpretation - Fetus A: Reactive  NST Interpreted By: KELLEN Galindo MD  Comments: per my read: reactive      Meds:     Current Facility-Administered Medications:   •  insulin lispro (HUMALOG) injection 1 Units, 1 Units, Subcutaneous, TID AC, Eulalia Lainez M.D., 3 Units at 19 0850  •  insulin NPH (HUMULIN,NOVOLIN) injection 50 Units, 50 Units, Subcutaneous, BID INSULIN, Eulalia Lainez M.D., 50 Units at 19 0850    Labs:  Results for ALYSSA CURTIS (MRN [id number]) as of 2019 11:52   Ref. Range 2019 06:39 2019 10:16   Glucose - Accu-Ck Latest Ref Range: 65 - 99 mg/dL 140 (H) 132 (H)         A/P: 23 y.o.  @ 26w0d by 15wk US  with " poorly controlled T1DM, fetal cardiac anomalies admitted for glucose control also found to have L otitis media; infant with cardiac abnormality  - currently on NPH 50units BID, lispro pre-mail 1:10 carb ratio   [facility] endocrine contacted this AM by Dr. Bright, unable to help with DM in pregnancy at this time   Pt unable to afford lispro previously, consulting SW today, if regular insulin more readily available will consider switch to this from lispro   Consider IM consultation for BG control if needed, BGs improved this AM.  - otitis media: will give augmentin x5d given exam today and length of worsening symptoms  - FHT reactive; pt has f/u plans for biventricular and septal hypertrophy; do not anticipate delivery during this stay              NST daily     dispo: cont antepartum management of T1DM    Eulalia Lainez MD  [facility], Women's Health

## 2019-06-07 NOTE — PROGRESS NOTES
1715 - Patient of [facility] presents from the office for antepartum admission. Anderson Gestation - 27.1 weeks    BSFS today at 219 at [facility], order received by Tex GUTHRIE RN/charge for direct admission to antepartum and urine tox screen.  Patient reports her fasting BS has been in the 300 range.     Reports staying on a 1800 ADA diet.   Patient reports she currently takes NPH 50 u am with breakfast and 35 u at bedtime, patient denies taking another medication - states she has not taken lispro since February due to insurance/financial issues.    Reports having DM since she was 7 years old. Denies contractions/cramping, denies ROM or Bleeding. Denies change to vision/edema/HA, Reports FM. FM/TOCO use discussed and placed, POC discussed. Oriented to room.   No family/friends present. Flat affect/mood. Patient encouraged to call RN with all questions concerns needs prn.    1815 - Report to Dr. Galindo regarding patient arrival/complaint/status. Order received for cbc/cmp/ua/IV, POC discussed with patient.     1835 - Call to physician requesting a diabetic tray; patient has not eaten since 1400 today. Physician would like to see the labs first. Order placed for 2000 ADA -food tray prior to the [facility] kitchen closing - plan to keep tray at the nurses station until physician reviews labs and approves or changes diet order.     1900 - Report to Lesley CUELLAR RN

## 2019-06-07 NOTE — PROGRESS NOTES
1900: Received report from EDEN Flores RN. POC discussed.    1930: EDEN Hinds RN. assisting with pt care, getting pt settled.    0119: PD lasting 4 minutes, pt repositioned on far left side, FHT recovered.    0223: Dr. Bright notified of PD at 0116am and VD at 0215 am.    0700 Report given to BRENDA Venegas RN.

## 2019-06-07 NOTE — PROGRESS NOTES
EDC - 19 EGA - 27.1 Pt states [facility]  making her 25.6     - Assisting TAZ Farooq RN with pt care. IV started, saline locked.    Admission procedures completed at this time.   Dr. Galindo notified that pt has not been taking lispro since February, pt woul like to eat dinner, Orders received for 1 time dose of 10 units of lispro per pt's carb counting for dinner tray.    insulin lispro given with dinner tray. . Dr. Galindo notified, pt educated to call when done eating so that we can do 1 hour post prandial.   TAZ Farooq RN assumed care of pt, needs BS done at 2200

## 2019-06-07 NOTE — DIETARY
Nutrition Note:  Patient seen yesterday by Dietitian/CDE to review meal plan during pregnancy. See progress notes.

## 2019-06-07 NOTE — PROGRESS NOTES
07-Report received from TAZ Farooq RN. .POC discussed, pt verbalized understanding. Patient is a  edc  making her 26.0 weeks.     0710- dietary called regarding problem with diet order. Order modified.     720- Dr Bright called. Informed of patient pre breakfast sugar. Will continue with plan of care.     0750- discussed plan of care with patient. Waiting on breakfast tray. Patient denies any contractions, leaking of any fluid or any vaginal bleeding. States positive fetal movement. Denies any S&S of hypo/hypergylcemia. All questions answered, no further needs at this time.  Assessment complete.     0850- breakfast tray at bedside. Patient given insulin.     1015-fingerstick 132 after breakfast. Dr Galindo updated. Continue to monitor.     1225- fingerstick before lunch 94. Patient states she is feeling well. Patient given insulin as ordered.     1345- diabetic education at bedside.    1505-  Fingerstick 111 after lunch

## 2019-06-07 NOTE — CARE PLAN
Problem: Risk for Infection, Impaired Wound Healing  Goal: Remain free from signs and symptoms of infection  Outcome: PROGRESSING AS EXPECTED  No s/s of infection noted, pt remains afebrile    Problem: Pain  Goal: Alleviation of Pain or a reduction in pain to the patient's comfort goal  Outcome: PROGRESSING AS EXPECTED  Pt denies pain

## 2019-06-07 NOTE — PROGRESS NOTES
OB H&P:    CC: elevated blood sugar    HPI:  Ms. Nicolasa Curtis is a 23 y.o.  @ 25w6d by 15wk US (pt reports unsure LMP) with T1 diabetes sent from [facility] for admission for improved glucose control.    Patient has had very poorly controlled diabetes throughout this pregnancy.  Initially had difficulty getting with endocrine, hemoglobin A1c greater than 10 in early pregnancy.  Patient has been seen by [facility] she reports twice during this pregnancy, last was approximately 1-1/2 months ago.  Reports that the provider she was seeing there is no longer with them and she was not told when to follow-up, does not have an upcoming follow-up appointment.  Patient has also been seen by Dr. Alvarado, reports last time was about a month ago.  Reports that she is follow-up later this month.  Reports that she was told on her last ultrasound that the walls of the baby's heart appear big.    Feeling well today.  Reports that she had a fever last week, thinks it may have been related to an ear infection or sinus infection.  Reports some mild congestion but no fevers in the last few days.  Otherwise feeling well.    Patient had anatomy ultrasound with Dr. Alvarado which was concerning for intraventricular cardiac hypertrophy.  Patient then was seen by cardiology where a fetal echo was performed showing biventricular hypertrophy and septal hypertrophy.  Plan at that time was for follow-up in 8 weeks for repeat fetal echo.    Contractions: No   Loss of fluid: No   Vaginal bleeding: No   Fetal movement: +      PNL:  Rh+/-, RI, HIV neg, RPR NR, HBsAg NR, GC/CT neg/neg      ROS:  Const: denies fevers, general concerns  CV/resp: reports no concerns  GI: denies abd pain, GI concerns  : see HPI  Neuro: reports no HA/vision changes    OB History    Para Term  AB Living   1             SAB TAB Ectopic Molar Multiple Live Births                    # Outcome Date GA Lbr Chuy/2nd Weight Sex Delivery Anes PTL Lv   1 Current   "                 GYN: denies STIs, no cervical procedures    Past Medical History:   Diagnosis Date   • Diabetes (HCC)     Type I Dx in 2005   • Nausea and vomiting during pregnancy prior to 22 weeks gestation 3/14/2019       PSHx: denies      Meds:  NPH 50units BID  Previously on lispro 10carbs:1unit premeals      FamHx: denies    Social History     Social History   • Marital status: Single     Spouse name: N/A   • Number of children: N/A   • Years of education: N/A     Occupational History   • Not on file.     Social History Main Topics   • Smoking status: Never Smoker   • Smokeless tobacco: Never Used   • Alcohol use No   • Drug use: No   • Sexual activity: Yes     Partners: Male      Comment: Unplanned pregnancy. pt states was not on birth control     Other Topics Concern   • Not on file     Social History Narrative   • No narrative on file       PE:  Vitals:    19 1800 19   BP: 125/75 123/74   Pulse: (!) 109 (!) 113   Resp: 16 16   Temp: 36.2 °C (97.2 °F) 36.7 °C (98.1 °F)   TempSrc: Temporal Temporal   Weight: 61.7 kg (136 lb)    Height: 1.651 m (5' 5\")      gen: AAO, NAD  abd: soft, gravid, NT      Alyssa Curtis, mariano  at 25w6d with an ELIZABETH of 2019, Alternate ELIZABETH Entry, was seen at [facility] for a nonstress test.    Nonstress Test  Reason for NST:  (fetal cardiac anomaly)  Variability: Moderate  Decelerations: Variable (mild, gestational age appropriate)  Accelerations: Yes  Acoustic Stimulator: No  Baseline: 155  Uterine Irritability: No  Contractions: Not present  Nonstress Test Interpretation  $ Nonstress Test Interpretation - Fetus A: Reactive  NST Interpreted By: KELLEN Galindo MD  Comments: per my read: reactive              Results for ALYSSA CURTIS (MRN [id number]) as of 2019 21:48   Ref. Range 2019 18:23   Sodium Latest Ref Range: 135 - 145 mmol/L 131 (L)   Potassium Latest Ref Range: 3.6 - 5.5 mmol/L 4.0   Chloride Latest Ref Range: 96 - 112 mmol/L 100 "   Co2 Latest Ref Range: 20 - 33 mmol/L 20   Anion Gap Latest Ref Range: 0.0 - 11.9  11.0   Glucose Latest Ref Range: 65 - 99 mg/dL 254 (H)   Bun Latest Ref Range: 8 - 22 mg/dL 10   Creatinine Latest Ref Range: 0.50 - 1.40 mg/dL 0.52   GFR If  Latest Ref Range: >60 mL/min/1.73 m 2 >60   GFR If Non  Latest Ref Range: >60 mL/min/1.73 m 2 >60   Calcium Latest Ref Range: 8.5 - 10.5 mg/dL 8.3 (L)   AST(SGOT) Latest Ref Range: 12 - 45 U/L 9 (L)   ALT(SGPT) Latest Ref Range: 2 - 50 U/L 6   Alkaline Phosphatase Latest Ref Range: 30 - 99 U/L 61   Total Bilirubin Latest Ref Range: 0.1 - 1.5 mg/dL 0.7   Albumin Latest Ref Range: 3.2 - 4.9 g/dL 3.2   Total Protein Latest Ref Range: 6.0 - 8.2 g/dL 6.6   Globulin Latest Ref Range: 1.9 - 3.5 g/dL 3.4   A-G Ratio Latest Units: g/dL 0.9       A/P: 23 y.o.  @ 25w6d by 15wk US with poorly control T1DM admitted for BG control  - will start at prescribed home dose NPH 50units BID, lispro 1:10 carb ratio prior to meals   Will have DM education come tomorrow  - FHT reactive; pt has f/u plans for biventricular and septal hypertrophy; do not anticipate delivery during this stay   NST daily  - consider endocrine consult if able to obtain here, [facility] (currently out of town) if needed vs close f/u with him as scheduled for fetal cardiac anomaly  - no signs of DKA    Eulalia Lainez MD  [facility], Women's Health

## 2019-06-08 VITALS
TEMPERATURE: 97.6 F | WEIGHT: 136 LBS | HEIGHT: 65 IN | SYSTOLIC BLOOD PRESSURE: 127 MMHG | BODY MASS INDEX: 22.66 KG/M2 | DIASTOLIC BLOOD PRESSURE: 72 MMHG | RESPIRATION RATE: 16 BRPM | HEART RATE: 99 BPM

## 2019-06-08 LAB
GLUCOSE BLD-MCNC: 112 MG/DL (ref 65–99)
GLUCOSE BLD-MCNC: 138 MG/DL (ref 65–99)
GLUCOSE BLD-MCNC: 47 MG/DL (ref 65–99)
GLUCOSE BLD-MCNC: 67 MG/DL (ref 65–99)
GLUCOSE BLD-MCNC: 90 MG/DL (ref 65–99)
GLUCOSE BLD-MCNC: 92 MG/DL (ref 65–99)
GLUCOSE BLD-MCNC: 92 MG/DL (ref 65–99)

## 2019-06-08 PROCEDURE — 99238 HOSP IP/OBS DSCHRG MGMT 30/<: CPT | Performed by: OBSTETRICS & GYNECOLOGY

## 2019-06-08 PROCEDURE — A9270 NON-COVERED ITEM OR SERVICE: HCPCS | Performed by: OBSTETRICS & GYNECOLOGY

## 2019-06-08 PROCEDURE — 700102 HCHG RX REV CODE 250 W/ 637 OVERRIDE(OP): Performed by: OBSTETRICS & GYNECOLOGY

## 2019-06-08 PROCEDURE — 82962 GLUCOSE BLOOD TEST: CPT | Mod: 91

## 2019-06-08 RX ORDER — AMOXICILLIN AND CLAVULANATE POTASSIUM 500; 125 MG/1; MG/1
1 TABLET, FILM COATED ORAL 2 TIMES DAILY
Qty: 6 TAB | Refills: 0 | Status: SHIPPED | OUTPATIENT
Start: 2019-06-08

## 2019-06-08 RX ADMIN — INSULIN HUMAN 35 UNITS: 100 INJECTION, SUSPENSION SUBCUTANEOUS at 08:11

## 2019-06-08 RX ADMIN — INSULIN LISPRO 2 UNITS: 100 INJECTION, SOLUTION INTRAVENOUS; SUBCUTANEOUS at 08:14

## 2019-06-08 RX ADMIN — INSULIN LISPRO 5 UNITS: 100 INJECTION, SOLUTION INTRAVENOUS; SUBCUTANEOUS at 12:30

## 2019-06-08 RX ADMIN — AMOXICILLIN AND CLAVULANATE POTASSIUM 1 TABLET: 500; 125 TABLET, FILM COATED ORAL at 08:59

## 2019-06-08 ASSESSMENT — PATIENT HEALTH QUESTIONNAIRE - PHQ9
1. LITTLE INTEREST OR PLEASURE IN DOING THINGS: NOT AT ALL
2. FEELING DOWN, DEPRESSED, IRRITABLE, OR HOPELESS: NOT AT ALL
SUM OF ALL RESPONSES TO PHQ9 QUESTIONS 1 AND 2: 0

## 2019-06-08 NOTE — PROGRESS NOTES
1900-rcvd report from dayshift RN and assumed care of pt.  1919-FSBS after dinner 152  2103-FM/TOCO applied  2131-reactive tracing applied  0230-pt called out stating her BS was dropping. Blood sugar 47. Dr. Bright in department-orders rcvd for juice.  0311-blood sugar rechecked-92. Pt resting comfortably at this time.  0555-pt called out stating BS dropping again. Blood sugar 67 but states she still feels as bad as she did when sugar was 47. Juice given. Dr. Bright updated and orders rcvd to decrease NPH down to 35 units am/pm.  0700-report given to dayshift RN

## 2019-06-08 NOTE — PROGRESS NOTES
Diabetes education: Met with pt and So this afternoon. Please see consult note.   Plan: recommend before pt discharges , prescription be sent to pt's pharmacy and someone checks the pharmacy for coverage before pt leaves to make sure no problems exist. [facility] medicaid covers Lispro insulin as Admelog vials (only) without a PA. If MD wishes to continue on Humalog, please contact Sheila JEONG CM on Monday to assist with this. Spoke with MD and GDM diet was reordered as well as a correctional scale ac only.   Please call [phone] if needs change.

## 2019-06-08 NOTE — DISCHARGE SUMMARY
Discharge Summary      Date of Admission: 2019  Date of Discharge: 2019    Admission Dx: Pregnancy.  Patient admitted with severe elevated blood sugars noted on  visit.    Hyperglycemia due to type 1 diabetes mellitus (HCC),   Patient Active Problem List    Diagnosis Date Noted   • Abnormal  AFP screen 2019   • Nausea and vomiting during pregnancy prior to 22 weeks gestation 2019   • Type 1 diabetes mellitus without complication (HCC) 2019   • Supervision of high risk pregnancy, antepartum 2019      Discharge Dx: Intrauterine pregnancy at 26 and 1 day gestational age.  Uncontrolled diabetes    Delivery Date: Not applicable    Past Medical History:   Diagnosis Date   • Diabetes (HCC)     Type I Dx in    • Nausea and vomiting during pregnancy prior to 22 weeks gestation 3/14/2019     History reviewed. No pertinent surgical history.  OB History    Para Term  AB Living   1             SAB TAB Ectopic Molar Multiple Live Births                    # Outcome Date GA Lbr Chuy/2nd Weight Sex Delivery Anes PTL Lv   1 Current                 Allergies: Patient has no known allergies.    Hospital Course:   23 y.o. , admitted at 25 weeks and 6 days gestational age due to severely elevated blood sugars noted during  visit.  Was started on NPH and lispro.  Blood sugars improved.  No evidence of ketoacidosis.      Patient without complaints today and desires discharge.     Vitals:    19 0002 19 0602 19 0809 19 1217   BP: 118/76 122/69 121/75    Pulse: (!) 105 100 (!) 105    Resp: 17 16     Temp: 35.9 °C (96.7 °F) 36.8 °C (98.3 °F) 36.3 °C (97.3 °F) 36.4 °C (97.6 °F)   TempSrc: Temporal Temporal Temporal Temporal   Weight:       Height:         Exam:  General: Alert and oriented x3, no apparent distress, cooperative  Abdomen soft, non-tender. BS normal. No masses,  No organomegaly  ExtremitiesNormal    Atrial testing shows no  evidence of fetal distress.    Meds:   No current facility-administered medications on file prior to encounter.      Current Outpatient Prescriptions on File Prior to Encounter   Medication Sig Dispense Refill   • insulin NPH (HUMULIN,NOVOLIN) 100 UNIT/ML Suspension Patient administering 50 units am, 35 units QHS 1 Vial 3   • glucose blood (ONETOUCH VERIO) strip 1 Strip by Other route 4 times a day. Use One Touch Verio Flex Meter 120 Strip 5   • insulin lispro (HUMALOG) 100 UNIT/ML Solution 1 unit per 10 gm of carbs at each meal 1 Vial 10   • Prenatal Multivit-Min-Fe-FA (PRENATAL VITAMINS PO) Take 1 tablet by mouth every day.         Labs:   Recent Results (from the past 168 hour(s))   POCT Glucose    Collection Time: 06/06/19  3:07 PM   Result Value Ref Range    Glucose - Accu-Ck 219 (A) 70 - 100 mg/dL   URINE DRUG SCREEN    Collection Time: 06/06/19  4:45 PM   Result Value Ref Range    Amphetamines Urine Negative Negative    Barbiturates Negative Negative    Benzodiazepines Negative Negative    Cocaine Metabolite Negative Negative    Methadone Negative Negative    Opiates Negative Negative    Oxycodone Negative Negative    Phencyclidine -Pcp Negative Negative    Propoxyphene Negative Negative    Cannabinoid Metab Positive (A) Negative   URINALYSIS    Collection Time: 06/06/19  4:45 PM   Result Value Ref Range    Color Yellow     Character Cloudy (A)     Specific Gravity 1.037 <1.035    Ph 6.0 5.0 - 8.0    Glucose >=1000 (A) Negative mg/dL    Ketones >=160 Negative mg/dL    Protein Negative Negative mg/dL    Bilirubin Negative Negative    Urobilinogen, Urine 1.0 Negative    Nitrite Negative Negative    Leukocyte Esterase Small (A) Negative    Occult Blood Negative Negative    Micro Urine Req Microscopic    URINE MICROSCOPIC (W/UA)    Collection Time: 06/06/19  4:45 PM   Result Value Ref Range    WBC  (A) /hpf    RBC 2-5 (A) /hpf    Bacteria Many (A) None /hpf    Epithelial Cells Few /hpf    Hyaline Cast 6-10  (A) /lpf   CBC WITH DIFFERENTIAL    Collection Time: 06/06/19  6:23 PM   Result Value Ref Range    WBC 13.0 (H) 4.8 - 10.8 K/uL    RBC 3.69 (L) 4.20 - 5.40 M/uL    Hemoglobin 11.7 (L) 12.0 - 16.0 g/dL    Hematocrit 35.6 (L) 37.0 - 47.0 %    MCV 96.5 81.4 - 97.8 fL    MCH 31.7 27.0 - 33.0 pg    MCHC 32.9 (L) 33.6 - 35.0 g/dL    RDW 44.6 35.9 - 50.0 fL    Platelet Count 212 164 - 446 K/uL    MPV 11.6 9.0 - 12.9 fL    Neutrophils-Polys 69.10 44.00 - 72.00 %    Lymphocytes 20.60 (L) 22.00 - 41.00 %    Monocytes 9.40 0.00 - 13.40 %    Eosinophils 0.20 0.00 - 6.90 %    Basophils 0.20 0.00 - 1.80 %    Immature Granulocytes 0.50 0.00 - 0.90 %    Nucleated RBC 0.00 /100 WBC    Neutrophils (Absolute) 8.95 (H) 2.00 - 7.15 K/uL    Lymphs (Absolute) 2.67 1.00 - 4.80 K/uL    Monos (Absolute) 1.22 (H) 0.00 - 0.85 K/uL    Eos (Absolute) 0.02 0.00 - 0.51 K/uL    Baso (Absolute) 0.03 0.00 - 0.12 K/uL    Immature Granulocytes (abs) 0.07 0.00 - 0.11 K/uL    NRBC (Absolute) 0.00 K/uL   Comp Metabolic Panel    Collection Time: 06/06/19  6:23 PM   Result Value Ref Range    Sodium 131 (L) 135 - 145 mmol/L    Potassium 4.0 3.6 - 5.5 mmol/L    Chloride 100 96 - 112 mmol/L    Co2 20 20 - 33 mmol/L    Anion Gap 11.0 0.0 - 11.9    Glucose 254 (H) 65 - 99 mg/dL    Bun 10 8 - 22 mg/dL    Creatinine 0.52 0.50 - 1.40 mg/dL    Calcium 8.3 (L) 8.5 - 10.5 mg/dL    AST(SGOT) 9 (L) 12 - 45 U/L    ALT(SGPT) 6 2 - 50 U/L    Alkaline Phosphatase 61 30 - 99 U/L    Total Bilirubin 0.7 0.1 - 1.5 mg/dL    Albumin 3.2 3.2 - 4.9 g/dL    Total Protein 6.6 6.0 - 8.2 g/dL    Globulin 3.4 1.9 - 3.5 g/dL    A-G Ratio 0.9 g/dL   ESTIMATED GFR    Collection Time: 06/06/19  6:23 PM   Result Value Ref Range    GFR If African American >60 >60 mL/min/1.73 m 2    GFR If Non African American >60 >60 mL/min/1.73 m 2   ACCU-CHEK GLUCOSE    Collection Time: 06/06/19  6:40 PM   Result Value Ref Range    Glucose - Accu-Ck 229 (H) 65 - 99 mg/dL   ACCU-CHEK GLUCOSE    Collection  Time: 06/06/19  8:34 PM   Result Value Ref Range    Glucose - Accu-Ck 208 (H) 65 - 99 mg/dL   ACCU-CHEK GLUCOSE    Collection Time: 06/06/19 10:10 PM   Result Value Ref Range    Glucose - Accu-Ck 173 (H) 65 - 99 mg/dL   ACCU-CHEK GLUCOSE    Collection Time: 06/07/19  6:39 AM   Result Value Ref Range    Glucose - Accu-Ck 140 (H) 65 - 99 mg/dL   ACCU-CHEK GLUCOSE    Collection Time: 06/07/19 10:16 AM   Result Value Ref Range    Glucose - Accu-Ck 132 (H) 65 - 99 mg/dL   ACCU-CHEK GLUCOSE    Collection Time: 06/07/19 12:26 PM   Result Value Ref Range    Glucose - Accu-Ck 94 65 - 99 mg/dL   ACCU-CHEK GLUCOSE    Collection Time: 06/07/19  3:09 PM   Result Value Ref Range    Glucose - Accu-Ck 111 (H) 65 - 99 mg/dL   ACCU-CHEK GLUCOSE    Collection Time: 06/07/19  5:29 PM   Result Value Ref Range    Glucose - Accu-Ck 106 (H) 65 - 99 mg/dL   ACCU-CHEK GLUCOSE    Collection Time: 06/07/19  7:19 PM   Result Value Ref Range    Glucose - Accu-Ck 152 (H) 65 - 99 mg/dL   ACCU-CHEK GLUCOSE    Collection Time: 06/08/19  2:34 AM   Result Value Ref Range    Glucose - Accu-Ck 47 (L) 65 - 99 mg/dL   ACCU-CHEK GLUCOSE    Collection Time: 06/08/19  3:11 AM   Result Value Ref Range    Glucose - Accu-Ck 92 65 - 99 mg/dL   ACCU-CHEK GLUCOSE    Collection Time: 06/08/19  5:57 AM   Result Value Ref Range    Glucose - Accu-Ck 67 65 - 99 mg/dL   ACCU-CHEK GLUCOSE    Collection Time: 06/08/19  8:11 AM   Result Value Ref Range    Glucose - Accu-Ck 112 (H) 65 - 99 mg/dL   ACCU-CHEK GLUCOSE    Collection Time: 06/08/19  9:57 AM   Result Value Ref Range    Glucose - Accu-Ck 90 65 - 99 mg/dL   ACCU-CHEK GLUCOSE    Collection Time: 06/08/19 12:29 PM   Result Value Ref Range    Glucose - Accu-Ck 92 65 - 99 mg/dL           Discharge Instructions:  Patient asked to continue to check her blood sugars and to record all blood sugar levels.  Current regimen:  NPH 35 units in morning and evening  Lispro 1 unit per 10 units of carbohydrates    Patient provided  with insulin until she can have the issues with the insurance company figured out    Patient was asked to return to the hospital in case of hypoglycemia or severely elevated blood sugar levels.  Decreased fetal movement, vaginal bleeding, loss of fluid consistent with rupture membranes or she has any questions or concerns.    Follow-up next week with the [facility]    Discharge to home      Discharge Meds:   NPH insulin  Lispro insulin  Augmentin  Prior vitamins

## 2019-06-08 NOTE — CONSULTS
Diabetes education: Met with pt, who has a hx of type one diabetes and SO. Pt states she has had diabetes since she was young. Lived in [place] and was on Lantus and Humalog, tried carb counting but struggled, moved to [place], was place on 70/30 insulin ( per pt and SO did not do well) and moved to [place]. Pt [facility] for gestational Diabetes on 2/22/19 and was on 70/30 insulin. Pt had been followed by Miranda SPAIN with the [facility].  Pt was seen x 1 by Sharri GARDNER ( who is no longer with [facility]), on 4/23 and was placed on/stated from note: NPH 50 units breakfast and dinner, Lispro 10 units tid meals and 1 unit for every 50 mg/dl above 150 ac meals. Pt had been followed by pregnancy center and per notes, ( often did not bring blood sugar log but verbalized results), was taking Humalog 1 unit per 10 gm cho with meals and NPH 50 units in AM and 35 units pm.   Spoke with pt and SO who said they were not able to get the Humalog as it needed a prior auth. Stated they did not want to go back to the other insulin (70/30 insulin) as it did not work. Not sure if pt was every on (or able to get Humalog). Per [facility]  Medicaid formulary, Admelog ( Lispro) is covered without a prior auth as well as Basaglar kwik pens, Humulin R, NPH, Novolin R , NPH, and One touch ultra and Verio systems.  Humalog  Is not listed. Spoke with Sheila JEONG CM and if MD wishes to continue pt on Humalog she can assist with PA on Monday.  Discussed with pt need to eat 3 meals and 3 snacks as per GDM diet and why, as pt was saying she is is carb counting she does not need to be on a GDM diet ( GDM diet both offers correct amounts of carbs in am and snack as well as assures not long length of time between meals). Pt had many education sessions with [employer] CDE that included carb counting, not sure if she is still struggling or if other issues.   Pt has type one diabetes and needs insulin for both meals and corrections.   In hospital she is getting finger sticks ac and 1 hour pp. Not sure if she will comply with this at home. Discharge medications may need to reflect this. Pt was given a log book and requested she document blood sugars and insulin as given in hospital( and at home as well) to help assess insulin needs.  Explained to SO and pt Admelog is a Lispro like Humalog and that she was not going to go back on 70/30. Discussed importance of following meal plan, testing blood sugars, documenting them, bringing record to MD appointments, and having trouble with prescriptions following up while at the pharmacy.  Plan: recommend before pt discharges , prescription be sent to pt's pharmacy and someone checks the pharmacy for coverage before pt leaves to make sure no problems exist. Please call [phone] if needs change.

## 2019-06-08 NOTE — PROGRESS NOTES
0700 Repot received from Carmen JEONG. POC discussed.   0900 Pt finished breakfast  1127 External monitors placed  1216 FHT monitoring completed.   1350 Dr. Corley at bedside. POC discussed.Pt to continue with routine prenatal visits at [facility] and follow insulin orders by Dr. Corley, next apt is on 6/13/2019 at [facility].   1427 Discharge instructions given including Hypoglycemia and hyperglycemia signs and symptoms,  labor precautions, UCs, ROM, VB, abn fm/fetal kick counts, fluid intake, when to return to L&D and follow up with [facility]. Questions answered and Pt verbalized agreement with POC and discharge. Discharge paperwork signed. Pt discharged via ambulation in stable condition with FOB and personal belongings.

## 2019-06-08 NOTE — DISCHARGE INSTRUCTIONS
Follow-up as scheduled next week on June 13th, 2019.  Please monitor and record all blood sugar levels .      Current insulin regimen:  * NPH 35 units in the morning and evening  * Lispro 1 unit per every 10 carbs consumed     Please call or return to clinic if any questions or concerns      Pre-term Labor (<37 weeks):  Call your physician or return to the hospital if:  · You have painless regular contractions more than 4 in one hour.  · Your water breaks (remember time and color).  · You have menstrual-like cramps, a low dull backache or pressure in your pelvis or back.  · Your baby does not move enough to complete the daily kick count (10 movements in 2 hours).  · Your baby moves much less often than on the days before or you have not felt your baby move all day.  · Please review the MEDICATION LIST section of your AFTER VISIT SUMMARY document.  · Take your medication as prescribed

## 2019-06-13 ENCOUNTER — ROUTINE PRENATAL (OUTPATIENT)
Dept: OBGYN | Facility: CLINIC | Age: 24
End: 2019-06-13
Payer: MEDICAID

## 2019-06-13 ENCOUNTER — NON-PROVIDER VISIT (OUTPATIENT)
Dept: OBGYN | Facility: CLINIC | Age: 24
End: 2019-06-13
Payer: MEDICAID

## 2019-06-13 VITALS — WEIGHT: 139 LBS | DIASTOLIC BLOOD PRESSURE: 60 MMHG | SYSTOLIC BLOOD PRESSURE: 118 MMHG | BODY MASS INDEX: 23.13 KG/M2

## 2019-06-13 DIAGNOSIS — O24.019 TYPE 1 DIABETES MELLITUS DURING PREGNANCY, ANTEPARTUM: ICD-10-CM

## 2019-06-13 DIAGNOSIS — O24.414 INSULIN CONTROLLED GESTATIONAL DIABETES MELLITUS (GDM) IN THIRD TRIMESTER: ICD-10-CM

## 2019-06-13 DIAGNOSIS — E10.9 TYPE 1 DIABETES MELLITUS WITHOUT COMPLICATION (HCC): ICD-10-CM

## 2019-06-13 LAB — GLUCOSE BLD-MCNC: 144 MG/DL (ref 70–100)

## 2019-06-13 PROCEDURE — 90040 PR PRENATAL FOLLOW UP: CPT | Performed by: OBSTETRICS & GYNECOLOGY

## 2019-06-13 PROCEDURE — 97803 MED NUTRITION INDIV SUBSEQ: CPT | Performed by: DIETITIAN, REGISTERED

## 2019-06-13 PROCEDURE — 82962 GLUCOSE BLOOD TEST: CPT | Performed by: OBSTETRICS & GYNECOLOGY

## 2019-06-13 NOTE — PROGRESS NOTES
Follow Up Nutrition Assessment:   Time: 2:46-3:14pm     Subjective:  -Pt states she is feeling better and able to tolerate food again   She is eating 3 meals and 1-2 snacks   -Is testing blood sugars 6 x per day fasting, before meals, and after meals (forgets some times)  States she is taking NPH 50 units AM, 36 units q HS and also takes Lispro (sample from the hospital) 1:10g   -Admits she doesn't always eat all the carbs she counted and has had a couple low blood sugars as a result.   -States that it is hard to count carbs when eating out/ fast food     Diet hx:   B- 8am cereal or eggs and toast   S- pbj or crackers   L-  popeyes chicken, biscuit, lemonade, mashed potatoes   S- chips   D- tacos or chx parmesean with green beans   S- almonds     Objective:   Anthropometrics:   Today's Weight: 139lb   Pre-pregnancy weight: 115lb  Total weight gain: +24lb  Weeks gestation: 26w6d    Biochemical data, medical test and procedures:  Lab Results   Component Value Date/Time    POCGLUCOSE 144 (A) 06/13/2019 02:38 PM       Glucose Log Book (most recent):    Fasting glucose range:      Pre meal =    1 hour post prandial:     Assessment:   Recommended Diet:  1800 calorie meal plan (see media for detailed meal plan)    Assessment Notes:   Today we reviewed carb counting - pt was still not able to identify what food have carbs and which do not. Showed pt how to read labels again as well as how to use [employer] or my fitness pal to look up chain restaurant items.   She needs more practice with carb counting.   Reminded pt of why not to eat simple sugars (including sweetened beverages such as lemonade) during pregnancy, even with using insulin.   Recommended she use 1 cup milk to correct for a low blood sugar, instead of juice as she has been using.   She is doing better with eating more frequent meals and snacks but not yet following a consistent diet. She would benefit from eating breakfast closer to when she  wakes up. Wakes up at 6am and doesn't eat breakfast until 8am. Checks fasting at 6am and before breakfast at 8am. The 8am sugar is higher than fasting which may be due to gluconeogenesis which would improve if she eats breakfast sooner. Recommended bedtime snack at 10pm (if breakfast is at 8am for example) to prevent going >10hr fasting overnight.    Since pt has not been able to appropriately using the insulin to carb ratio, it may be better to transition her to a set dose of insulin at meals and give her a more specific meal plan to follow with set grams of carbs and specific examples to help with compliance. As she progresses through pregnancy, the insulin to carb ratio will not likely be effective, especially at breakfast due to hormones.     Plan: Monitoring & Evaluation  Goals:  1. Follow meal plan as outlined above; 3 meals and 3 snacks daily.   2. Check FSBS 4 times a day  (fasting, and 1 hour after each meal)  3. FSBS Goals= Fasting <90; 1 hour PP <130   4. Bring blood sugar log book to each appointment   5. Appropriate weight gain during pregnancy  6. No sweets or sweetened beverages   7. Insulin adjusted by MD today as follows:    NPH 50 units AM, 38 units HS    Lispro (humalog or admelog) 1:10g    ?if pt is using correction/ sliding scale - didn't ask her about that today     Consider transitioning to a set dose of short acting insulin with meals (admelog/ lispro). And I will review consistent carb diet with pt next week.       Follow up 1 week   Miranda Zambrano, RD, LD, CDE  [phone]

## 2019-06-13 NOTE — PROGRESS NOTES
S: Pt 23 y.o.  26w6d with type I DM here for OB follow up. Doing better since discharge. Patient was admitted 1 week ago for diabetic ketoacidosis and aggressive blood sugar management.  After discharge she states she was having problems getting her short acting insulin covered by her insurance.  She continues to take her NPH without problems    positive fetal movement.    negative vaginal bleeding.    negative leakage of fluid.    negative contractions.    Reviewed blood sugar log with patient.  Reviewed diet.  Questions answered.    FBS range: 102-130  1hr post prandial range: all over 140 however there are 2 values under 70. Francisca states she is carb counting and giving lispro but doesn't finish all her food. She drinks a small cup of juice for this  A1C 10.2  POC glucose: 144    Current Insulin regimen  AM: NPH 50  Dinner: 0  Bedtime: NPH 36  Lispro 1:10 carb ratio    O: VSS Afeb      See prenatal vitals, chart for today's exam  Fundal height: 26  FH: 155    A/P:  23 y.o.  26w6d with IDDM who presents for obstetric follow-up.  - Fetal echo w/ biventricular and septal hypertrophy on . Patient has appt with MFM on  set up already   - Change insulin dosage as follows:  AM: Cont 50 NPH,  Dinner: 0 Reg,   QHS: increase to 38 NPH  Lispro 1:10 carb  Patient seen briefly with the nutritionist at bedside.  The patient is tolerating food much better now and is able to have 3 meals +2 snacks during the day.  Her blood sugars are also doing much better and although most of them are over 140 they are not in the DKA range any longer.  - Advised patient we would increase her NPH slowly as tolerated.   - NSTs: 2x/week to begin at 32 weeks.  - Delivery by 39 weeks for class A2 or worse  - Continue prenatal vitamins.  - Follow-up in 1 weeks for obstetric follow-up.

## 2019-06-18 ENCOUNTER — TELEPHONE (OUTPATIENT)
Dept: HEALTH INFORMATION MANAGEMENT | Facility: MEDICAL CENTER | Age: 24
End: 2019-06-18

## 2019-06-18 NOTE — TELEPHONE ENCOUNTER
Spoke with Nicolasa today at [facility]. Pt states her pharmacy at [facility] did not have the rx for Admelog insulin. She is using the Lispro still that was provided to her in the hospital so she reports taking insulin as prescribed (lispro at meals). Pt states she also needs more test strips and NPH. Spoke with Dr. Jj who called in a rx for Admelog over the phone, and sent prescriptions again for NPH and test strips for One Touch Verio electronically to the [facility] on [address]. Notified pt that this was all ordered and recommended she  medications today or tomorrow.   Will follow up with pt on Thursday to discuss meal plan and consider a set dose of insulin with meals on consistent carb controlled GDM diet, if hyperglycemia is ongoing w/ insulin to carb ratio.

## 2019-06-28 ENCOUNTER — DATING (OUTPATIENT)
Dept: OBGYN | Facility: CLINIC | Age: 24
End: 2019-06-28

## 2019-06-28 DIAGNOSIS — O09.90 SUPERVISION OF HIGH RISK PREGNANCY, ANTEPARTUM: ICD-10-CM

## 2019-06-28 NOTE — ADDENDUM NOTE
Encounter addended by: Mckenna King R.N. on: 6/28/2019  3:36 PM<BR>    Actions taken: Flowsheet accepted

## 2019-07-23 ENCOUNTER — TELEPHONE (OUTPATIENT)
Dept: HEALTH INFORMATION MANAGEMENT | Facility: MEDICAL CENTER | Age: 24
End: 2019-07-23

## 2019-07-23 NOTE — TELEPHONE ENCOUNTER
Called patient today to inform her she needs to schedule an OB appt at [facility] ASAP for a Thursday / diabetes clinic. Called phone number in chart and left a voicemail.   Miranda Zambrano, GRABIEL, LD, CDE  [phone]

## 2024-01-01 NOTE — PROGRESS NOTES
GDM T1D. OB F/U.  + fetal movement  Denies any VB, LO or UC's  Phone #[phone]  Pharmacy confirmed  Diabetic supplies: Syringes pending   Random Glucose: 144 post 1400     [id number]